# Patient Record
Sex: FEMALE | Race: WHITE | Employment: FULL TIME | URBAN - METROPOLITAN AREA
[De-identification: names, ages, dates, MRNs, and addresses within clinical notes are randomized per-mention and may not be internally consistent; named-entity substitution may affect disease eponyms.]

---

## 2021-05-13 ENCOUNTER — OFFICE VISIT (OUTPATIENT)
Dept: FAMILY MEDICINE CLINIC | Age: 51
End: 2021-05-13
Payer: COMMERCIAL

## 2021-05-13 VITALS
WEIGHT: 148.2 LBS | DIASTOLIC BLOOD PRESSURE: 82 MMHG | HEIGHT: 62 IN | BODY MASS INDEX: 27.27 KG/M2 | RESPIRATION RATE: 14 BRPM | SYSTOLIC BLOOD PRESSURE: 115 MMHG | TEMPERATURE: 98 F | HEART RATE: 79 BPM

## 2021-05-13 DIAGNOSIS — Z13.220 SCREENING CHOLESTEROL LEVEL: ICD-10-CM

## 2021-05-13 DIAGNOSIS — Z82.49 FH: CARDIOVASCULAR DISEASE: ICD-10-CM

## 2021-05-13 DIAGNOSIS — G47.9 SLEEP DISTURBANCE: ICD-10-CM

## 2021-05-13 DIAGNOSIS — R07.9 CHEST PAIN, UNSPECIFIED TYPE: Primary | ICD-10-CM

## 2021-05-13 DIAGNOSIS — H02.63 XANTHELASMA OF EYELID, BILATERAL: ICD-10-CM

## 2021-05-13 DIAGNOSIS — J34.89 LESION OF NOSE: ICD-10-CM

## 2021-05-13 DIAGNOSIS — Z11.4 SCREENING FOR HUMAN IMMUNODEFICIENCY VIRUS WITHOUT PRESENCE OF RISK FACTORS: ICD-10-CM

## 2021-05-13 DIAGNOSIS — Z11.59 ENCOUNTER FOR HEPATITIS C SCREENING TEST FOR LOW RISK PATIENT: ICD-10-CM

## 2021-05-13 DIAGNOSIS — H02.66 XANTHELASMA OF EYELID, BILATERAL: ICD-10-CM

## 2021-05-13 DIAGNOSIS — Z12.11 COLON CANCER SCREENING: ICD-10-CM

## 2021-05-13 DIAGNOSIS — R09.89 BILATERAL CAROTID BRUITS: ICD-10-CM

## 2021-05-13 DIAGNOSIS — Z12.39 ENCOUNTER FOR OTHER SCREENING FOR MALIGNANT NEOPLASM OF BREAST: ICD-10-CM

## 2021-05-13 PROCEDURE — 99204 OFFICE O/P NEW MOD 45 MIN: CPT | Performed by: NURSE PRACTITIONER

## 2021-05-13 RX ORDER — HYDROXYZINE HYDROCHLORIDE 25 MG/1
25 TABLET, FILM COATED ORAL NIGHTLY PRN
Qty: 30 TABLET | Refills: 1 | Status: SHIPPED | OUTPATIENT
Start: 2021-05-13 | End: 2021-07-12

## 2021-05-13 SDOH — HEALTH STABILITY: MENTAL HEALTH: HOW OFTEN DO YOU HAVE A DRINK CONTAINING ALCOHOL?: NEVER

## 2021-05-13 ASSESSMENT — PATIENT HEALTH QUESTIONNAIRE - PHQ9
SUM OF ALL RESPONSES TO PHQ QUESTIONS 1-9: 0
SUM OF ALL RESPONSES TO PHQ9 QUESTIONS 1 & 2: 0
1. LITTLE INTEREST OR PLEASURE IN DOING THINGS: 0

## 2021-05-13 NOTE — PROGRESS NOTES
present for a long time. Significant Past Medical History:  History reviewed. No pertinent past medical history. Allergies:  has No Known Allergies. Medications:   Current Outpatient Medications   Medication Sig Dispense Refill    hydrOXYzine (ATARAX) 25 MG tablet Take 1 tablet by mouth nightly as needed (insomnia) 30 tablet 1     No current facility-administered medications for this visit. Review of systems:  Review of Systems - History obtained from the patient  General ROS: negative for - chills, fatigue or fever  Psychological ROS: sleep disturbances  ENT ROS: negative for - headaches, nasal congestion or nasal discharge  Allergy and Immunology ROS: negative for - seasonal allergies  Hematological and Lymphatic ROS: negative for - bruising  Endocrine ROS: negative for - malaise/lethargy, polydipsia/polyuria or temperature intolerance  Respiratory ROS: negative for - cough,  shortness of breath or wheezing  Cardiovascular ROS: occasional chest pain  Gastrointestinal ROS: negative for - abdominal pain, constipation, diarrhea or nausea/vomiting  Musculoskeletal ROS: negative for - joint pain or muscle pain  Neurological ROS: negative for - dizziness  Dermatological ROS: white bumps on eyelids, raised lesion on tip of nose    Physical Examination:  /82 (Site: Left Upper Arm, Position: Sitting, Cuff Size: Large Adult)   Pulse 79   Temp 98 °F (36.7 °C) (Temporal)   Resp 14   Ht 5' 2\" (1.575 m)   Wt 148 lb 3.2 oz (67.2 kg)   BMI 27.11 kg/m²     General-  Alert and oriented x 3, NAD  HEENT: NC,  anicteric sclerae, xanthelasma bilateral eyes, left more than right  Ears: Normal tympanic membranes bilaterally  Nose: patent, . Non-firm, fixed, Flesh colored papule tip of nose.    Mouth: no lesions, moist mucosas  Neck - supple, no significant adenopathy, carotid bruit noted bilaterally  Chest - clear to auscultation, no wheezes, rales or rhonchi, symmetric air entry  Heart - normal rate, Specific Question:   Reason for exam:     Answer:   Screening    CBC Auto Differential     Standing Status:   Future     Standing Expiration Date:   5/13/2022    Comprehensive Metabolic Panel     Standing Status:   Future     Standing Expiration Date:   5/13/2022    Lipid Panel     Standing Status:   Future     Standing Expiration Date:   5/13/2022     Order Specific Question:   Is Patient Fasting?/# of Hours     Answer:   12 hour    Vitamin D 25 Hydroxy     Standing Status:   Future     Standing Expiration Date:   5/13/2022    Urinalysis with Microscopic     Standing Status:   Future     Standing Expiration Date:   5/14/2022     Order Specific Question:   SPECIFY(EX-CATH,MIDSTREAM,CYSTO,ETC)?      Answer:   midstream    TSH without Reflex     Standing Status:   Future     Standing Expiration Date:   5/13/2022    HIV-1 and HIV-2 Antibodies     Standing Status:   Future     Standing Expiration Date:   5/13/2022    Hepatitis C Antibody     Standing Status:   Future     Standing Expiration Date:   5/13/2022   David Shelton MD, Gastroenterology, [facility] [employer]     Referral Priority:   Routine     Referral Type:   Eval and Treat     Referral Reason:   Specialty Services Required     Referred to Provider:   Ivon Brownlee MD     Requested Specialty:   Gastroenterology     Number of Visits Requested:   Ann Salinas MD, Opthalmology, [facility] [employer]     Referral Priority:   Routine     Referral Type:   Eval and Treat     Referral Reason:   Specialty Services Required     Referred to Provider:   Rylan Salcido MD     Requested Specialty:   Ophthalmology     Number of Visits Requested:   1   Honey Goodwin MD, Plastic Surgery, [facility] [employer]     Referral Priority:   Routine     Referral Type:   Eval and Treat     Referral Reason:   Specialty Services Required     Referred to Provider:   Elisha Denney MD     Requested Specialty:   Plastic Surgery     Number of Visits Requested:   1    CARDIAC STRESS TEST EXERCISE ONLY     Standing Status:   Future     Standing Expiration Date:   7/12/2021     Order Specific Question:   Reason for Exam?     Answer:   Chest pain    EKG 12 lead     Standing Status:   Future     Standing Expiration Date:   7/12/2021     Order Specific Question:   Reason for Exam?     Answer:   Chest pain     Orders Placed This Encounter   Medications    hydrOXYzine (ATARAX) 25 MG tablet     Sig: Take 1 tablet by mouth nightly as needed (insomnia)     Dispense:  30 tablet     Refill:  1     Fasting lab work  Ultrasound of carotid arteries  EKG for baseline  GI referral for colonoscopy  Ophthalmology referral for [address] Surgery referral for spot on nose  Hydroxyzine 1/2- 1 whole tab nightly as needed for sleep  Order for mammogram  Discussed healthy lifestyle modifications  Discussed removal of the Nevi on the posterior neck, as well as doing her Pap here in the office at a later visit. Declines Shingles vaccine today. Does not plan to get COVID vaccine. States her Tetanus is UTD within last 10 years. Follow Up:  Return in about 4 weeks (around 6/10/2021) for for sleep, and to follow up on referrals.  Taco Damon, APRN - CNP

## 2021-05-17 ENCOUNTER — NURSE ONLY (OUTPATIENT)
Dept: LAB | Age: 51
End: 2021-05-17

## 2021-05-17 DIAGNOSIS — H02.66 XANTHELASMA OF EYELID, BILATERAL: ICD-10-CM

## 2021-05-17 DIAGNOSIS — H02.63 XANTHELASMA OF EYELID, BILATERAL: ICD-10-CM

## 2021-05-17 DIAGNOSIS — Z11.4 SCREENING FOR HUMAN IMMUNODEFICIENCY VIRUS WITHOUT PRESENCE OF RISK FACTORS: ICD-10-CM

## 2021-05-17 DIAGNOSIS — R07.9 CHEST PAIN, UNSPECIFIED TYPE: ICD-10-CM

## 2021-05-17 DIAGNOSIS — Z82.49 FH: CARDIOVASCULAR DISEASE: ICD-10-CM

## 2021-05-17 DIAGNOSIS — R09.89 BILATERAL CAROTID BRUITS: ICD-10-CM

## 2021-05-17 DIAGNOSIS — Z11.59 ENCOUNTER FOR HEPATITIS C SCREENING TEST FOR LOW RISK PATIENT: ICD-10-CM

## 2021-05-17 LAB
ALBUMIN SERPL-MCNC: 4.1 G/DL (ref 3.5–5.1)
ALP BLD-CCNC: 58 U/L (ref 38–126)
ALT SERPL-CCNC: 11 U/L (ref 11–66)
ANION GAP SERPL CALCULATED.3IONS-SCNC: 9 MEQ/L (ref 8–16)
AST SERPL-CCNC: 16 U/L (ref 5–40)
BACTERIA: ABNORMAL
BASOPHILS # BLD: 0.6 %
BASOPHILS ABSOLUTE: 0 THOU/MM3 (ref 0–0.1)
BILIRUB SERPL-MCNC: 0.3 MG/DL (ref 0.3–1.2)
BILIRUBIN URINE: NEGATIVE
BLOOD, URINE: NEGATIVE
BUN BLDV-MCNC: 17 MG/DL (ref 7–22)
CALCIUM SERPL-MCNC: 9.1 MG/DL (ref 8.5–10.5)
CASTS: ABNORMAL /LPF
CASTS: ABNORMAL /LPF
CHARACTER, URINE: CLEAR
CHLORIDE BLD-SCNC: 103 MEQ/L (ref 98–111)
CHOLESTEROL, TOTAL: 247 MG/DL (ref 100–199)
CO2: 27 MEQ/L (ref 23–33)
COLOR: YELLOW
CREAT SERPL-MCNC: 0.9 MG/DL (ref 0.4–1.2)
CRYSTALS: ABNORMAL
EOSINOPHIL # BLD: 2.2 %
EOSINOPHILS ABSOLUTE: 0.1 THOU/MM3 (ref 0–0.4)
EPITHELIAL CELLS, UA: ABNORMAL /HPF
ERYTHROCYTE [DISTWIDTH] IN BLOOD BY AUTOMATED COUNT: 12.9 % (ref 11.5–14.5)
ERYTHROCYTE [DISTWIDTH] IN BLOOD BY AUTOMATED COUNT: 42.5 FL (ref 35–45)
GFR SERPL CREATININE-BSD FRML MDRD: 66 ML/MIN/1.73M2
GLUCOSE BLD-MCNC: 86 MG/DL (ref 70–108)
GLUCOSE, URINE: NEGATIVE MG/DL
HCT VFR BLD CALC: 49.4 % (ref 37–47)
HDLC SERPL-MCNC: 52 MG/DL
HEMOGLOBIN: 15.3 GM/DL (ref 12–16)
HEPATITIS C ANTIBODY: NEGATIVE
HIV AG/AB: NONREACTIVE
IMMATURE GRANS (ABS): 0.01 THOU/MM3 (ref 0–0.07)
IMMATURE GRANULOCYTES: 0.2 %
KETONES, URINE: ABNORMAL
LDL CHOLESTEROL CALCULATED: 184 MG/DL
LEUKOCYTE ESTERASE, URINE: NEGATIVE
LYMPHOCYTES # BLD: 35.1 %
LYMPHOCYTES ABSOLUTE: 1.8 THOU/MM3 (ref 1–4.8)
MCH RBC QN AUTO: 28 PG (ref 26–33)
MCHC RBC AUTO-ENTMCNC: 31 GM/DL (ref 32.2–35.5)
MCV RBC AUTO: 90.3 FL (ref 81–99)
MISCELLANEOUS LAB TEST RESULT: ABNORMAL
MONOCYTES # BLD: 12.9 %
MONOCYTES ABSOLUTE: 0.6 THOU/MM3 (ref 0.4–1.3)
NITRITE, URINE: POSITIVE
NUCLEATED RED BLOOD CELLS: 0 /100 WBC
PH UA: 5.5 (ref 5–9)
PLATELET # BLD: 250 THOU/MM3 (ref 130–400)
PMV BLD AUTO: 11.6 FL (ref 9.4–12.4)
POTASSIUM SERPL-SCNC: 4.1 MEQ/L (ref 3.5–5.2)
PROTEIN UA: NEGATIVE MG/DL
RBC # BLD: 5.47 MILL/MM3 (ref 4.2–5.4)
RBC URINE: ABNORMAL /HPF
RENAL EPITHELIAL, UA: ABNORMAL
SEG NEUTROPHILS: 49 %
SEGMENTED NEUTROPHILS ABSOLUTE COUNT: 2.5 THOU/MM3 (ref 1.8–7.7)
SODIUM BLD-SCNC: 139 MEQ/L (ref 135–145)
SPECIFIC GRAVITY UA: 1.02 (ref 1–1.03)
TOTAL PROTEIN: 7.9 G/DL (ref 6.1–8)
TRIGL SERPL-MCNC: 56 MG/DL (ref 0–199)
TSH SERPL DL<=0.05 MIU/L-ACNC: 3.28 UIU/ML (ref 0.4–4.2)
UROBILINOGEN, URINE: 0.2 EU/DL (ref 0–1)
VITAMIN D 25-HYDROXY: 16 NG/ML (ref 30–100)
WBC # BLD: 5 THOU/MM3 (ref 4.8–10.8)
WBC UA: ABNORMAL /HPF
YEAST: ABNORMAL

## 2021-05-19 ENCOUNTER — TELEPHONE (OUTPATIENT)
Dept: FAMILY MEDICINE CLINIC | Age: 51
End: 2021-05-19

## 2021-05-19 DIAGNOSIS — E55.9 VITAMIN D DEFICIENCY: Primary | ICD-10-CM

## 2021-05-19 DIAGNOSIS — N30.00 ACUTE CYSTITIS WITHOUT HEMATURIA: ICD-10-CM

## 2021-05-19 DIAGNOSIS — E78.00 MILD HYPERCHOLESTEROLEMIA: ICD-10-CM

## 2021-05-19 RX ORDER — CIPROFLOXACIN 250 MG/1
250 TABLET, FILM COATED ORAL 2 TIMES DAILY
Qty: 6 TABLET | Refills: 0 | Status: SHIPPED | OUTPATIENT
Start: 2021-05-19 | End: 2021-05-22

## 2021-05-19 NOTE — TELEPHONE ENCOUNTER
Spoke with patient regarding results. Patient verbalized understanding with no questions at this time. Mailing lab orders to repeat in 3 months. Patient will  medication and otc vitamin.

## 2021-05-19 NOTE — TELEPHONE ENCOUNTER
----- Message from QUAN Johnson CNP sent at 5/19/2021 10:44 AM EDT -----  Urine showed UTI, will treat with Cipro x3 days. Vitamin D is low at 16 (should be >30). Can get OTC Vit D3, 1000 IU, take 2 caps daily and recheck in 3 months. Total cholesterol and LDL are elevated, but doesn't need medication yet. Recommend diet low in saturated fats and processed carbohydrates, and aim for 30-45 minutes of physical activity daily. Recheck in 3 months.

## 2021-05-24 ENCOUNTER — OFFICE VISIT (OUTPATIENT)
Dept: FAMILY MEDICINE CLINIC | Age: 51
End: 2021-05-24
Payer: COMMERCIAL

## 2021-05-24 VITALS
HEIGHT: 62 IN | WEIGHT: 146 LBS | SYSTOLIC BLOOD PRESSURE: 131 MMHG | RESPIRATION RATE: 12 BRPM | DIASTOLIC BLOOD PRESSURE: 89 MMHG | BODY MASS INDEX: 26.87 KG/M2 | TEMPERATURE: 97.6 F | HEART RATE: 79 BPM

## 2021-05-24 DIAGNOSIS — N64.4 PAIN OF BOTH BREASTS: Primary | ICD-10-CM

## 2021-05-24 PROCEDURE — 99213 OFFICE O/P EST LOW 20 MIN: CPT | Performed by: NURSE PRACTITIONER

## 2021-05-24 SDOH — ECONOMIC STABILITY: FOOD INSECURITY: WITHIN THE PAST 12 MONTHS, YOU WORRIED THAT YOUR FOOD WOULD RUN OUT BEFORE YOU GOT MONEY TO BUY MORE.: PATIENT DECLINED

## 2021-05-24 ASSESSMENT — SOCIAL DETERMINANTS OF HEALTH (SDOH): HOW HARD IS IT FOR YOU TO PAY FOR THE VERY BASICS LIKE FOOD, HOUSING, MEDICAL CARE, AND HEATING?: PATIENT DECLINED

## 2021-05-24 NOTE — PROGRESS NOTES
[address] [facility] [address] [facility], [address]  Ph:   [phone]  Fax: [phone]    Provider:  QUAN Johnson - CNP    Patient:  Keyonna Singh  YOB: 1970      Visit Date:  5/24/2021     Reason For Visit:   Chief Complaint   Patient presents with    Breast Pain     Bilateral breast pain -- dull ache         Jaspal Mckee is a 48 y.o. female who comes today to the office for breast pain. She states she has been doing well, taking her medications as prescribed. She denies any side effects from her medications. Bilateral breast pain, dull, ache. Heaviness. Present for over a year. No nipple discharge. No dimpling of the skin. Never felt a lump or mass. Occasional self breast exams. FH of breast cancer, on her mother's side, but not mom specifically. At new patient visit recently, she was prescribed Hydroxyzine 25 mg 1 tab nightly as needed for sleep. She took it and it worked well, but she was groggy next day, so she is going to try half a tab next time. Significant Past Medical History:  History reviewed. No pertinent past medical history. Allergies:  has No Known Allergies. Medications:   Current Outpatient Medications   Medication Sig Dispense Refill    hydrOXYzine (ATARAX) 25 MG tablet Take 1 tablet by mouth nightly as needed (insomnia) 30 tablet 1     No current facility-administered medications for this visit.        Review of systems:  Review of Systems - History obtained from the patient  General ROS: negative for - chills, fatigue or fever  : Bilateral breast pain    Physical Examination:  /89 (Site: Left Upper Arm, Position: Sitting, Cuff Size: Medium Adult)   Pulse 79   Temp 97.6 °F (36.4 °C) (Temporal)   Resp 12   Ht 5' 2\" (1.575 m)   Wt 146 lb (66.2 kg)   BMI 26.70 kg/m²     General-  Alert and oriented x 3, NAD  Breasts: breasts appear normal, no suspicious masses, no skin or nipple changes or axillary nodes. Impression:   Diagnosis Orders   1. Pain of both breasts  MEENA DIGITAL DIAGNOSTIC W OR WO CAD BILATERAL       Plan:  Orders Placed This Encounter   Procedures    MEENA DIGITAL DIAGNOSTIC W OR WO CAD BILATERAL     Standing Status:   Future     Standing Expiration Date:   7/24/2022     Order Specific Question:   Reason for exam:     Answer:   bilateral breast pain     No orders of the defined types were placed in this encounter. Follow Up:  Return in about 18 days (around 6/11/2021).     Merlinda Bence, APRN - CNP

## 2021-06-03 ENCOUNTER — HOSPITAL ENCOUNTER (OUTPATIENT)
Dept: NON INVASIVE DIAGNOSTICS | Age: 51
Discharge: HOME | End: 2021-06-03
Payer: COMMERCIAL

## 2021-06-03 ENCOUNTER — HOSPITAL ENCOUNTER (OUTPATIENT)
Dept: INTERVENTIONAL RADIOLOGY/VASCULAR | Age: 51
Discharge: HOME | End: 2021-06-03
Payer: COMMERCIAL

## 2021-06-03 DIAGNOSIS — Z82.49 FH: CARDIOVASCULAR DISEASE: ICD-10-CM

## 2021-06-03 DIAGNOSIS — R09.89 BILATERAL CAROTID BRUITS: ICD-10-CM

## 2021-06-03 DIAGNOSIS — R07.9 CHEST PAIN, UNSPECIFIED TYPE: ICD-10-CM

## 2021-06-03 PROCEDURE — 93880 EXTRACRANIAL BILAT STUDY: CPT

## 2021-06-03 PROCEDURE — 93017 CV STRESS TEST TRACING ONLY: CPT | Performed by: NUCLEAR MEDICINE

## 2021-06-04 ENCOUNTER — HOSPITAL ENCOUNTER (OUTPATIENT)
Age: 51
Discharge: HOME | End: 2021-06-04
Payer: COMMERCIAL

## 2021-06-04 DIAGNOSIS — R07.9 CHEST PAIN, UNSPECIFIED TYPE: ICD-10-CM

## 2021-06-04 DIAGNOSIS — Z82.49 FH: CARDIOVASCULAR DISEASE: ICD-10-CM

## 2021-06-04 LAB
EKG ATRIAL RATE: 70 BPM
EKG P AXIS: 48 DEGREES
EKG P-R INTERVAL: 120 MS
EKG Q-T INTERVAL: 394 MS
EKG QRS DURATION: 84 MS
EKG QTC CALCULATION (BAZETT): 425 MS
EKG R AXIS: 60 DEGREES
EKG T AXIS: 62 DEGREES
EKG VENTRICULAR RATE: 70 BPM

## 2021-06-04 PROCEDURE — 93005 ELECTROCARDIOGRAM TRACING: CPT

## 2021-06-07 ENCOUNTER — TELEPHONE (OUTPATIENT)
Dept: FAMILY MEDICINE CLINIC | Age: 51
End: 2021-06-07

## 2021-06-07 NOTE — TELEPHONE ENCOUNTER
----- Message from QUAN Rivera CNP sent at 6/7/2021  8:48 AM EDT -----  EKG normal. Stress test  not suggestive of ischemia. Medical management recommended. Will discuss more at follow up this week.

## 2021-06-11 ENCOUNTER — OFFICE VISIT (OUTPATIENT)
Dept: FAMILY MEDICINE CLINIC | Age: 51
End: 2021-06-11
Payer: COMMERCIAL

## 2021-06-11 ENCOUNTER — PATIENT MESSAGE (OUTPATIENT)
Dept: FAMILY MEDICINE CLINIC | Age: 51
End: 2021-06-11

## 2021-06-11 VITALS
TEMPERATURE: 98 F | RESPIRATION RATE: 16 BRPM | HEART RATE: 74 BPM | WEIGHT: 148 LBS | BODY MASS INDEX: 27.07 KG/M2 | DIASTOLIC BLOOD PRESSURE: 69 MMHG | SYSTOLIC BLOOD PRESSURE: 114 MMHG

## 2021-06-11 DIAGNOSIS — J34.89 LESION OF NOSE: ICD-10-CM

## 2021-06-11 DIAGNOSIS — E78.00 HYPERCHOLESTEROLEMIA: ICD-10-CM

## 2021-06-11 DIAGNOSIS — G47.9 SLEEP DISTURBANCE: ICD-10-CM

## 2021-06-11 DIAGNOSIS — Z80.6 FAMILY HISTORY OF AML (ACUTE MYELOID LEUKEMIA): ICD-10-CM

## 2021-06-11 DIAGNOSIS — N64.4 PAIN OF BOTH BREASTS: ICD-10-CM

## 2021-06-11 DIAGNOSIS — I77.9 LEFT-SIDED CAROTID ARTERY DISEASE, UNSPECIFIED TYPE (HCC): Primary | ICD-10-CM

## 2021-06-11 PROCEDURE — 99214 OFFICE O/P EST MOD 30 MIN: CPT | Performed by: NURSE PRACTITIONER

## 2021-06-11 RX ORDER — ATORVASTATIN CALCIUM 40 MG/1
40 TABLET, FILM COATED ORAL DAILY
Qty: 90 TABLET | Refills: 1 | Status: SHIPPED | OUTPATIENT
Start: 2021-06-11 | End: 2022-01-20 | Stop reason: SDUPTHER

## 2021-06-11 RX ORDER — POLYETHYLENE GLYCOL 3350, SODIUM SULFATE, SODIUM CHLORIDE, POTASSIUM CHLORIDE, ASCORBIC ACID, SODIUM ASCORBATE 140-9-5.2G
KIT ORAL
COMMUNITY
Start: 2021-06-07

## 2021-06-11 NOTE — TELEPHONE ENCOUNTER
From: Brook Charles  To: Eliezer Boggs APRN - CNP  Sent: 6/11/2021 1:27 PM EDT  Subject: Non-Urgent Medical Question    Hello called to schedule with Dr. Dieudonne Holguin they will need you to call and make the appointment so the insurance to cover visit thank you

## 2021-06-11 NOTE — TELEPHONE ENCOUNTER
I already placed the referral on 5/13. Please confirm it was sent over along with my note from that day.

## 2021-06-11 NOTE — PROGRESS NOTES
[facility] [facility] [address] [facility], [address]  Ph:   [phone]  Fax: [phone]    Provider:  Mirta Runner, APRN - CNP    Patient:  Max Baker  YOB: 1970      Visit Date:  6/11/2021     Reason For Visit:   Chief Complaint   Patient presents with    1 Month Follow-Up     still has pain in breast, mammo not scheduled yet(gave pt scheduling phone #)        Natasha Trujillo is a 48 y.o. female who comes today to the office for follow up. She states she has been doing well, taking her medications as prescribed. She denies any side effects from her medications. As a new patient she presented to me with small white bumps on her eyelids, presumed to be xanthelasma. She also had mild bilateral carotid bruit on exam.  Lab work 5/17 showed total cholesterol 247, triglycerides 56, HDL 52, and . Doppler of carotid arteries were unremarkable on the right, left CCA showed moderate intimal thickening no appreciable stenosis. She has a strong FH of CAD, father had MI at 22, again in his 35s, stent a few years ago. Also CAD in multiple uncles, 1 had sudden cardiac death at 29. She was referred to ophthalmology for the xanthelasma, but they have not reached out to her yet. Was having difficulty falling asleep. Started on hydroxyzine 25 mg 1 tab at night. She is taking that and states she is sleeping well. I saw her on 5/24 complaining of a dull aching, heaviness in both breasts, present for over a year. No discharge from the nipple or dimpling of the skin. She had never felt a mass. Diagnostic mammogram was ordered but she has not had it done yet. She has of family history of breast cancer on her mother side, but not her mother specifically. She was referred to plastic surgery for a bump on the end of her nose, but they have not reached out to her yet. Dad has Myelodysplastic leukemia,  diagnosed last year at age 68.  Doctor told her it was hereditary and family should be checked. She is requesting the genetic testing. Significant Past Medical History:  History reviewed. No pertinent past medical history. Allergies:  has No Known Allergies. Medications:   Current Outpatient Medications   Medication Sig Dispense Refill    atorvastatin (LIPITOR) 40 MG tablet Take 1 tablet by mouth daily 90 tablet 1    PLENVU 140 g SOLR  (Patient not taking: Reported on 6/11/2021)      hydrOXYzine (ATARAX) 25 MG tablet Take 1 tablet by mouth nightly as needed (insomnia) 30 tablet 1     No current facility-administered medications for this visit.        Review of systems:  Review of Systems - History obtained from the patient  General ROS: negative for - chills, fatigue or fever  Psychological ROS: negative for - anxiety, depression or sleep disturbances  ENT ROS: negative for - headaches, nasal congestion or nasal discharge  Allergy and Immunology ROS: negative for - seasonal allergies  Hematological and Lymphatic ROS: negative for - bruising  Endocrine ROS: negative for - malaise/lethargy, polydipsia/polyuria or temperature intolerance  Respiratory ROS: negative for - cough,  shortness of breath or wheezing  Cardiovascular ROS: negative for - chest pain or edema  Gastrointestinal ROS: negative for - abdominal pain, constipation, diarrhea or nausea/vomiting  Musculoskeletal ROS: negative for - joint pain or muscle pain  Neurological ROS: negative for - dizziness  Dermatological ROS: negative for - rash    Physical Examination:  /69   Pulse 74   Temp 98 °F (36.7 °C) (Temporal)   Resp 16   Wt 148 lb (67.1 kg)   BMI 27.07 kg/m²     General-  Alert and oriented x 3, NAD  HEENT: NC, AT, PERRLA, EOMI, anicteric sclerae  Ears: Normal tympanic membranes bilaterally  Nose: patent, no lesions  Mouth: no lesions, moist mucosas  Neck - supple, no significant adenopathy  Chest - clear to auscultation, no wheezes, rales or rhonchi, symmetric air entry  Heart - normal rate, regular rhythm, normal S1, S2, no murmurs, rubs, clicks or gallops  Abdomen - soft, nontender, nondistended, no masses or organomegaly  Extremities - peripheral pulses normal, no pedal edema, no clubbing or cyanosis    Impression:   Diagnosis Orders   1. Left-sided carotid artery disease, unspecified type ([address]  atorvastatin (LIPITOR) 40 MG tablet   2. Hypercholesterolemia  atorvastatin (LIPITOR) 40 MG tablet   3. Sleep disturbance     4. Pain of both breasts     5. Lesion of nose     6. Family history of AML (acute myeloid leukemia)         Plan:  No orders of the defined types were placed in this encounter. Orders Placed This Encounter   Medications    atorvastatin (LIPITOR) 40 MG tablet     Sig: Take 1 tablet by mouth daily     Dispense:  90 tablet     Refill:  1     Start Lipitor 40 mg 1 tab daily  Work on a low fat diet  I will look into the proper genetic testing for Myelodysplastic disorders. Schedule your mammogram to evaluate breast pain  Plastic surgery for bump on nose: Dr Indio Forbes, [address] [address] [facility] for Xanthelasma of eyelids, [phone]    Follow Up:  Return in about 4 months (around 10/11/2021).     Jimbo Alcala, APRN - CNP

## 2021-06-15 ENCOUNTER — NURSE ONLY (OUTPATIENT)
Dept: LAB | Age: 51
End: 2021-06-15

## 2021-06-16 ENCOUNTER — TELEPHONE (OUTPATIENT)
Dept: FAMILY MEDICINE CLINIC | Age: 51
End: 2021-06-16

## 2021-06-16 DIAGNOSIS — N64.4 PAIN OF BOTH BREASTS: Primary | ICD-10-CM

## 2021-06-16 DIAGNOSIS — N64.4 BREAST PAIN: Primary | ICD-10-CM

## 2021-06-16 NOTE — TELEPHONE ENCOUNTER
Patient is also complaining of pain in the bilateral breast. Radiologist at [facility] asking for a bilateral breast ultrasound.

## 2021-06-17 ENCOUNTER — HOSPITAL ENCOUNTER (OUTPATIENT)
Dept: WOMENS IMAGING | Age: 51
End: 2021-06-17
Payer: COMMERCIAL

## 2021-06-17 ENCOUNTER — HOSPITAL ENCOUNTER (OUTPATIENT)
Dept: WOMENS IMAGING | Age: 51
Discharge: HOME | End: 2021-06-17
Payer: COMMERCIAL

## 2021-06-17 DIAGNOSIS — N64.4 PAIN OF BOTH BREASTS: ICD-10-CM

## 2021-06-17 PROCEDURE — G0279 TOMOSYNTHESIS, MAMMO: HCPCS

## 2021-06-21 ENCOUNTER — TELEPHONE (OUTPATIENT)
Dept: FAMILY MEDICINE CLINIC | Age: 51
End: 2021-06-21

## 2021-06-21 NOTE — TELEPHONE ENCOUNTER
----- Message from QUAN Dorman CNP sent at 6/18/2021  3:20 PM EDT -----  No mammographic evidence of malignancy. A one year screening mammogram is recommended.   Some recommendations for pain include a more supportive bra, hot or cold compresses for comfort, Tylenol or ibuprofen, and reduce caffeine intake.

## 2022-01-14 ENCOUNTER — OFFICE VISIT (OUTPATIENT)
Dept: FAMILY MEDICINE CLINIC | Age: 52
End: 2022-01-14
Payer: COMMERCIAL

## 2022-01-14 VITALS
BODY MASS INDEX: 27.75 KG/M2 | HEIGHT: 62 IN | DIASTOLIC BLOOD PRESSURE: 82 MMHG | SYSTOLIC BLOOD PRESSURE: 120 MMHG | HEART RATE: 83 BPM | WEIGHT: 150.8 LBS

## 2022-01-14 DIAGNOSIS — I77.9 LEFT-SIDED CAROTID ARTERY DISEASE, UNSPECIFIED TYPE (HCC): ICD-10-CM

## 2022-01-14 DIAGNOSIS — E78.00 HYPERCHOLESTEROLEMIA: ICD-10-CM

## 2022-01-14 DIAGNOSIS — L81.9 MOTTLED SKIN: Primary | ICD-10-CM

## 2022-01-14 PROCEDURE — 99213 OFFICE O/P EST LOW 20 MIN: CPT | Performed by: NURSE PRACTITIONER

## 2022-01-14 NOTE — PROGRESS NOTES
General: She is not in acute distress. Appearance: She is well-developed. HENT:      Head: Normocephalic. Right Ear: External ear normal.      Left Ear: External ear normal.      Nose: Nose normal.      Mouth/Throat:      Pharynx: No oropharyngeal exudate. Eyes:      Conjunctiva/sclera: Conjunctivae normal.      Pupils: Pupils are equal, round, and reactive to light. Neck:      Thyroid: No thyromegaly. Cardiovascular:      Rate and Rhythm: Normal rate and regular rhythm. Heart sounds: Normal heart sounds. No murmur heard. No friction rub. No gallop. Pulmonary:      Effort: Pulmonary effort is normal. No respiratory distress. Breath sounds: Normal breath sounds. No wheezing or rales. Abdominal:      General: There is no distension. Palpations: Abdomen is soft. There is no mass. Tenderness: There is no abdominal tenderness. There is no guarding. Musculoskeletal:         General: Normal range of motion. Cervical back: Normal range of motion and neck supple. Lymphadenopathy:      Cervical: No cervical adenopathy. Skin:     General: Skin is warm and dry. Capillary Refill: Capillary refill takes less than 2 seconds. Comments: Bilateral lower legs with scattered spots of hypopigmentation. Neurological:      Mental Status: She is alert and oriented to person, place, and time. Psychiatric:         Behavior: Behavior normal.         Thought Content: Thought content normal.         Judgment: Judgment normal.                An electronic signature was used to authenticate this note.     --Deep Everett, QUAN - CNP

## 2022-01-15 ENCOUNTER — NURSE ONLY (OUTPATIENT)
Dept: LAB | Age: 52
End: 2022-01-15

## 2022-01-15 DIAGNOSIS — I77.9 LEFT-SIDED CAROTID ARTERY DISEASE, UNSPECIFIED TYPE (HCC): ICD-10-CM

## 2022-01-15 DIAGNOSIS — L81.9 MOTTLED SKIN: ICD-10-CM

## 2022-01-15 DIAGNOSIS — E78.00 HYPERCHOLESTEROLEMIA: ICD-10-CM

## 2022-01-15 LAB
BASOPHILS # BLD: 0.6 %
BASOPHILS ABSOLUTE: 0 THOU/MM3 (ref 0–0.1)
EOSINOPHIL # BLD: 2.6 %
EOSINOPHILS ABSOLUTE: 0.1 THOU/MM3 (ref 0–0.4)
ERYTHROCYTE [DISTWIDTH] IN BLOOD BY AUTOMATED COUNT: 13.5 % (ref 11.5–14.5)
ERYTHROCYTE [DISTWIDTH] IN BLOOD BY AUTOMATED COUNT: 45.1 FL (ref 35–45)
HCT VFR BLD CALC: 46 % (ref 37–47)
HEMOGLOBIN: 14.7 GM/DL (ref 12–16)
IMMATURE GRANS (ABS): 0.02 THOU/MM3 (ref 0–0.07)
IMMATURE GRANULOCYTES: 0.4 %
LYMPHOCYTES # BLD: 35.1 %
LYMPHOCYTES ABSOLUTE: 1.9 THOU/MM3 (ref 1–4.8)
MCH RBC QN AUTO: 28.9 PG (ref 26–33)
MCHC RBC AUTO-ENTMCNC: 32 GM/DL (ref 32.2–35.5)
MCV RBC AUTO: 90.6 FL (ref 81–99)
MONOCYTES # BLD: 13.7 %
MONOCYTES ABSOLUTE: 0.7 THOU/MM3 (ref 0.4–1.3)
NUCLEATED RED BLOOD CELLS: 0 /100 WBC
PLATELET # BLD: 321 THOU/MM3 (ref 130–400)
PMV BLD AUTO: 10.5 FL (ref 9.4–12.4)
RBC # BLD: 5.08 MILL/MM3 (ref 4.2–5.4)
RHEUMATOID FACTOR: < 10 IU/ML (ref 0–13)
SEDIMENTATION RATE, ERYTHROCYTE: 1 MM/HR (ref 0–20)
SEG NEUTROPHILS: 47.6 %
SEGMENTED NEUTROPHILS ABSOLUTE COUNT: 2.6 THOU/MM3 (ref 1.8–7.7)
WBC # BLD: 5.4 THOU/MM3 (ref 4.8–10.8)

## 2022-01-17 LAB
C-REACTIVE PROTEIN, HIGH SENSITIVITY: 1.2 MG/L
CYCLIC CITRULLINATED PEPTIDE ANTIBODY IGG: 2 U/ML (ref 0–7)

## 2022-01-19 LAB
ANA PATTERN: ABNORMAL
ANA SCREEN: DETECTED
ANA TITER: ABNORMAL
ANTINUCLEAR ANTIBODY, HEP-2, IGG: DETECTED

## 2022-01-20 DIAGNOSIS — R76.8 POSITIVE ANA (ANTINUCLEAR ANTIBODY): Primary | ICD-10-CM

## 2022-01-20 DIAGNOSIS — I77.9 LEFT-SIDED CAROTID ARTERY DISEASE, UNSPECIFIED TYPE (HCC): ICD-10-CM

## 2022-01-20 DIAGNOSIS — E78.00 HYPERCHOLESTEROLEMIA: ICD-10-CM

## 2022-01-20 DIAGNOSIS — L81.9 MOTTLED SKIN: ICD-10-CM

## 2022-01-20 RX ORDER — ATORVASTATIN CALCIUM 40 MG/1
40 TABLET, FILM COATED ORAL DAILY
Qty: 90 TABLET | Refills: 1 | Status: SHIPPED | OUTPATIENT
Start: 2022-01-20

## 2022-01-21 ENCOUNTER — TELEPHONE (OUTPATIENT)
Dept: FAMILY MEDICINE CLINIC | Age: 52
End: 2022-01-21

## 2022-01-21 NOTE — TELEPHONE ENCOUNTER
Spoke with the patient regarding results. Verbalized understanding. Rheumatology office will contact her directly for scheduling. No additional questions from the patient at this time.

## 2022-01-21 NOTE — TELEPHONE ENCOUNTER
----- Message from QUAN Witt CNP sent at 1/20/2022  5:21 PM EST -----  Most of her lab work was within acceptable range. Her ISHMAEL was positive, which is an antinuclear antibody, and it MAY signal an autoimmune disease. We need to refer to rheumatology for further work-up. It may also be related to the blotchy spots on her leg.

## 2022-11-30 NOTE — PROGRESS NOTES
Jitendra   Date Of Service: 12/1/2022  Provider: Brandy Vasquez DO, DO  Name: Nathalie Hargrove   MRN: [id number]    Chief Complaint(s)      Chief Complaint   Patient presents with    New Patient     New patient, referred by Dr. Robbie Hernandez, Positive ISHMAEL, mottled skin    Pt stated unable to rate pain - Bilateral leg pain (mostly R leg, but some L leg as well) Had bruise in R leg that spider webbed. History of Present illness (HPI)    Nathalie Hargrove   is a(n)52 y.o. female with a hx of hypercholesterolemia, left sided carotid artery disease referred by Shalini Espinosa, APR* for evaluation of  + ISHMAEL 1:320 speckled, mottled skin,     Bruise with mottled apperance along the bilat lower legs since 2021 , S/p evaluation by dermatology , pcp performed blood testing with + ISHMAEL , +  prior use of desk heater that was aimed at the legs but stopped using 6 months ago given a friend needed. Denies prior history of DVT, preeclampsia, eclampsia, family history of thromboembolic disease. Positive strong family history of coronary disease. + constant aching pain of he hands, knes, ankles, typically most in the mornings. Pain up to 6/10 over the past wk + gelling . Aggravating factors:cold expsoure . Alleviating factors:tylenol prn. No other tx. . Prior hx of Right ankle fracture. Weakness of legs with prolonged standing x 1 year. Sx's improved with sitting down for 5-10 minutes.        -denies Photosenstivity, Rash, dry mouth/dry eyes, oral/nasal sores, , digital ulcerations, skin tightening, renal disease,foamy urination, hematuria, sz's, blood clots,  pre-term labor loss, AIHA,leukpenia/lymphopenia, thrombocytopenia, hair loss, serositis, enthesitis, dactylitis, nail changes, hx of STD,  personal or family history of Psoriatic arthritis, psoriasis, ank spond,       Cancer screening: last pap - 89, up to date on mammo and c-scope       Review of Systems    Review of Systems Constitutional: Negative. HENT: Negative. Eyes: Negative. Respiratory: Negative. Cardiovascular: Negative. Gastrointestinal: Negative. Endocrine: Negative. Genitourinary: Negative. Skin:  Positive for color change. Neurological: Negative. Negative for dizziness and numbness. Hematological: Negative. PAST MEDICAL HISTORY     has no past medical history on file. PAST SURGICAL HISTORY     has a past surgical history that includes Dental surgery; Mouth Biopsy; Tubal ligation; Tubal ligation (1991); and Leg Surgery (Left). FAMILY HISTORY      Family History   Problem Relation Age of Onset    Heart Disease Father     High Blood Pressure Father     Coronary Art Dis Father         luekemia     Diabetes Paternal Aunt     High Blood Pressure Paternal Aunt     Diabetes Paternal Uncle     High Blood Pressure Paternal Uncle     Diabetes Maternal Grandmother     High Blood Pressure Maternal Grandmother     Heart Disease Maternal Grandmother     Cancer Maternal Grandmother     Diabetes Maternal Grandfather     High Blood Pressure Maternal Grandfather     Heart Disease Maternal Grandfather     Cancer Maternal Grandfather     Heart Disease Paternal Grandmother     Cancer Paternal Grandmother         uterine ca     High Blood Pressure Paternal Grandmother     Diabetes Paternal Grandmother     Heart Disease Paternal Grandfather     High Blood Pressure Paternal Grandfather     High Cholesterol Paternal Grandfather     Breast Cancer Maternal Cousin 47       SOCIAL HISTORY     reports that she has never smoked. She has never used smokeless tobacco. She reports that she does not drink alcohol and does not use drugs.       ALLERGIES   No Known Allergies    CURRENT MEDICATIONS      Current Outpatient Medications:     atorvastatin (LIPITOR) 40 MG tablet, Take 1 tablet by mouth daily, Disp: 90 tablet, Rfl: 1    PHYSICAL EXAMINATION / OBJECTIVE     Objective:  /84 (Site: Left Upper Arm, Position: Sitting, Cuff Size: Large Adult)   Pulse 97   Ht 5' 2.01\" (1.575 m)   Wt 152 lb 3.2 oz (69 kg)   SpO2 96%   BMI 27.83 kg/m²     Physical Exam  Vitals reviewed. Constitutional:       Appearance: Normal appearance. HENT:      Head: Normocephalic. Nose:      Comments: Left nasal septal sore - scabbed     Mouth/Throat:      Mouth: Mucous membranes are moist.      Comments: Geogrphic tongue  Eyes:      Conjunctiva/sclera: Conjunctivae normal.   Cardiovascular:      Rate and Rhythm: Normal rate. Heart sounds: No murmur heard. Pulmonary:      Effort: Pulmonary effort is normal.      Breath sounds: Normal breath sounds. Musculoskeletal:         General: No swelling or tenderness. Normal range of motion. Skin:     General: Skin is warm. Capillary Refill: Capillary refill takes less than 2 seconds. Decreased Right radial pulses. 2/4 dorsal pedis , left radial .      Findings: Lesion (hypopigmented lesions macular lesions) present. No rash. Comments: -- Scarred areas with hypopigmentation bilatera arms  -- Tattoos, arms, back, legs   -- white to blue color changes of the feet     Neurological:      General: No focal deficit present. Mental Status: She is alert and oriented to person, place, and time.                LABS        CBC  Lab Results   Component Value Date/Time    WBC 5.4 01/15/2022 07:22 AM    RBC 5.08 01/15/2022 07:22 AM    HGB 14.7 01/15/2022 07:22 AM    HCT 46.0 01/15/2022 07:22 AM    MCV 90.6 01/15/2022 07:22 AM    MCH 28.9 01/15/2022 07:22 AM    MCHC 32.0 01/15/2022 07:22 AM    RDW 13.4 02/09/2017 08:29 AM     01/15/2022 07:22 AM       CMP  Lab Results   Component Value Date/Time    CALCIUM 9.1 05/17/2021 07:36 AM    LABALBU 4.1 05/17/2021 07:36 AM    PROT 7.9 05/17/2021 07:36 AM     05/17/2021 07:36 AM    K 4.1 05/17/2021 07:36 AM    CO2 27 05/17/2021 07:36 AM     05/17/2021 07:36 AM    BUN 17 05/17/2021 07:36 AM    CREATININE 0.9 05/17/2021 07:36 AM    ALKPHOS 58 05/17/2021 07:36 AM    ALT 11 05/17/2021 07:36 AM    AST 16 05/17/2021 07:36 AM       HgBA1c: No components found for: HGBA1C    Lab Results   Component Value Date    TSH 3.280 05/17/2021     Lab Results   Component Value Date/Time    VITD25 16 05/17/2021 07:36 AM     Lab Results   Component Value Date    ANASCRN Detected (A) 01/15/2022     Lab Results   Component Value Date    RF < 10 01/15/2022       No components found for: CANCASCRN, APANCASCRN  Lab Results   Component Value Date    SEDRATE 1 01/15/2022       RADIOLOGY:       PROCEDURE: VL DUP CAROTID BILATERAL       CLINICAL INFORMATION: Bilateral carotid bruits, FH: cardiovascular disease       COMPARISON: No prior study. TECHNIQUE: Multiple grayscale and color flow images of both carotid systems and both vertebral arteries were obtained. Spectral Doppler waveforms were generated and velocity measurements were obtained. RIGHT    PSV/EDV        DIST CCA-------->64/27cm/s    PROX ICA-------->73/28cm/s    ECA---------------->99/23cm/s    VERT-------------->41/16cm/s        LEFT    PSV/EDV        DIST CCA-------->85/31cm/s    PROX ICA-------->62/30cm/s    ECA---------------->94/24cm/s    VERT-------------->62/22cm/s               Impression       1. RIGHT   ICA . Telma Banquete Telma Banquete Unremarkable . Telma Banquete ECA. Telma Banquete Unremarkable    CCA. Unremarkable . Telma Banquete VERT Antegrade flow. ..       2. LEFT    ICA. .... Unremarkable. .   ECA. .. Unremarkable   CCA. .. Moderate intimal thickening, no appreciable stenosis. Rodriguez Hatchet. Antegrade flow. [facility]          Patient Name: Willie Byrd         Patient Type: Emergency           MRN:  [id number]         Gender:  Female                   Account Number:  [de-identified]         [facility] Phys:  Thea Spillers    YOB: 1970         MILADYS GONSALEZ Pt Loc:  ED                                  R a d i o l o g y   R e p o r t            Accession Number:  Procedure Name: Exam Date/Time:        [id number]      VAS Duplex Extr Low Veins   12/1/2011 8:30:00 AM                           Rt            CPT4 code        12417                Reason For Exam        r/o dvt        ed 19                REPORT        RIGHT LOWER EXTREMITY VENOUS SONOGRAM:            CLINICAL INFORMATION:  Right posterior rib pain. PHYSICIAN INTERPRETATION:  Multiple color flow and spectral doppler        sonographic images were obtained of the right lower extremity. The common        femoral, femoral, greater saphenous and popliteal vein as well as the deep        veins of the calf are compressible and patent with no evidence of        intraluminal echogenic material.  There is no sonographic evidence of deep        venous thrombosis involving the right lower extremity. IMPRESSION:            NO SONOGRAPHIC EVIDENCE OF DEEP VENOUS THROMBOSIS            INVOLVING THE RIGHT LOWER EXTREMITY. ASSESSMENT/PLAN      1. ISHMAEL positive    2. Polyarthralgia    3. Mottled skin    4. Raynaud's disease without gangrene    5. Vitamin D deficiency      1. ISHMAEL positive 1:320 speckled   2. Polyarthralgia  --- arthralgia of the ankles, knees, hands - presnt for years, worse in the morning, and cold ewather. Denies am stiffness, or joint swelling. + Gelling. Exam with Raynaud's type color change of the hands, left nasal septal ulceration. Prior laboratory review revealing leukopenia x1 in 2017  --With the symptoms of arthralgias positive ISHMAEL, nasal septal ulcerations, Raynaud's type color changes and the prior history of a low white blood cell count there is concern for an underlying connective tissue disease such as systemic lupus. - Miscellaneous Sendout; Future - avise CTD panel.   - C3 Complement; Future  - C4 Complement; Future  - Complement, Total; Future  - Lupus Anticoagulant; Future  - Urinalysis; Future  - Protein / creatinine ratio, urine;  Future  - Electrophoresis Protein, Serum with Reflex to Immunofixation; Future  - Protein Electrophoresis, Urine; Future    3. Mottled skin -- ? Livedo vs erythema ab igne  --- eval for CTD, APLS ab's. - intact lower extremity pulses. Decreased Right radial pulses. - lower concern for PAD at this time. Patient also denies any history of prior DVT, preeclampsia, eclampsia, CVA or stroke. 4. Raynaud's disease without gangrene - red to blod color changes of the feet appreciated     - Miscellaneous Sendout; Future  - C3 Complement; Future  - C4 Complement; Future  - Complement, Total; Future  - Lupus Anticoagulant; Future  - Urinalysis; Future  - Protein / creatinine ratio, urine; Future  - Electrophoresis Protein, Serum with Reflex to Immunofixation; Future  - Protein Electrophoresis, Urine; Future    5. Vitamin D deficiency   - Vitamin D 25 Hydroxy; Future             No follow-ups on file. Electronically signed by Anselmo Phan DO on 12/1/2022 at 1:39 PM    New Prescriptions    No medications on file       12/1/2022       The risks and benefits of my recommendations, as well as other treatment options, benefits and side effects were discussed with the patient today. Questions were answered. Thank you for allowing me to participate in the care of this patient. Please call if there are any questions. 12-Oct-2021 13:46

## 2022-12-01 ENCOUNTER — NURSE ONLY (OUTPATIENT)
Dept: LAB | Age: 52
End: 2022-12-01

## 2022-12-01 ENCOUNTER — OFFICE VISIT (OUTPATIENT)
Dept: RHEUMATOLOGY | Age: 52
End: 2022-12-01
Payer: COMMERCIAL

## 2022-12-01 VITALS
SYSTOLIC BLOOD PRESSURE: 116 MMHG | HEIGHT: 62 IN | HEART RATE: 97 BPM | DIASTOLIC BLOOD PRESSURE: 84 MMHG | OXYGEN SATURATION: 96 % | BODY MASS INDEX: 28.01 KG/M2 | WEIGHT: 152.2 LBS

## 2022-12-01 DIAGNOSIS — M25.50 POLYARTHRALGIA: ICD-10-CM

## 2022-12-01 DIAGNOSIS — E55.9 VITAMIN D DEFICIENCY: ICD-10-CM

## 2022-12-01 DIAGNOSIS — R76.8 ANA POSITIVE: Primary | ICD-10-CM

## 2022-12-01 DIAGNOSIS — I73.00 RAYNAUD'S DISEASE WITHOUT GANGRENE: ICD-10-CM

## 2022-12-01 DIAGNOSIS — R76.8 ANA POSITIVE: ICD-10-CM

## 2022-12-01 DIAGNOSIS — L81.9 MOTTLED SKIN: ICD-10-CM

## 2022-12-01 LAB
BACTERIA: ABNORMAL
BILIRUBIN URINE: NEGATIVE
BLOOD, URINE: ABNORMAL
CASTS: ABNORMAL /LPF
CASTS: ABNORMAL /LPF
CHARACTER, URINE: CLEAR
COLOR: YELLOW
CREATININE URINE: 245.4 MG/DL
CRYSTALS: ABNORMAL
EPITHELIAL CELLS, UA: ABNORMAL /HPF
GLUCOSE, URINE: NEGATIVE MG/DL
KETONES, URINE: NEGATIVE
LEUKOCYTE EST, POC: NEGATIVE
MISCELLANEOUS LAB TEST RESULT: ABNORMAL
NITRITE, URINE: NEGATIVE
PH UA: 5 (ref 5–9)
PROT/CREAT RATIO, UR: 0.04
PROTEIN UA: NEGATIVE MG/DL
PROTEIN, URINE: 10.1 MG/DL
RBC URINE: ABNORMAL /HPF
RENAL EPITHELIAL, UA: ABNORMAL
SPECIFIC GRAVITY UA: 1.02 (ref 1–1.03)
UROBILINOGEN, URINE: 0.2 EU/DL (ref 0–1)
VITAMIN D 25-HYDROXY: 13 NG/ML (ref 30–100)
WBC UA: ABNORMAL /HPF
YEAST: ABNORMAL

## 2022-12-01 PROCEDURE — 99214 OFFICE O/P EST MOD 30 MIN: CPT | Performed by: INTERNAL MEDICINE

## 2022-12-01 ASSESSMENT — ENCOUNTER SYMPTOMS
EYES NEGATIVE: 1
COLOR CHANGE: 1
RESPIRATORY NEGATIVE: 1
GASTROINTESTINAL NEGATIVE: 1

## 2022-12-02 LAB
C3 COMPLEMENT: 131 MG/DL (ref 90–180)
COMPLEMENT C4: 25 MG/DL (ref 10–40)

## 2022-12-02 RX ORDER — ERGOCALCIFEROL 1.25 MG/1
50000 CAPSULE ORAL WEEKLY
Qty: 12 CAPSULE | Refills: 0 | Status: SHIPPED | OUTPATIENT
Start: 2022-12-02

## 2022-12-04 LAB — PROTEIN ELECTROPHORESIS, URINE: NORMAL

## 2022-12-05 LAB
COMPLEMENT TOTAL (CH50): 81.6 U/ML (ref 38.7–89.9)
DRVVT 1:1 MIX: ABNORMAL SEC (ref 33–44)
DRVVT CONFIRMATION TEST: ABNORMAL RATIO
DRVVT SCREEN: 27 SEC (ref 33–44)
HEXAGONAL PHOSPHOLIPID NEUTRALIZAT TEST: ABNORMAL
LUPUS ANTICOAG INTERP: ABNORMAL
PLATELET NEUTRALIZATION: ABNORMAL
PROTHROMBIN TIME: 13.4 SEC (ref 12–15.5)
PTT 1:1 MIX: 41 SEC (ref 32–48)
PTT LUPUS ANTICOAGULANT: 50 SEC (ref 32–48)
PTT-HEPARIN NEUTRALIZED: ABNORMAL SEC (ref 32–48)
REPTILASE TIME: ABNORMAL SEC
THROMBIN TIME: 15.8 SEC (ref 14.7–19.5)

## 2022-12-09 ENCOUNTER — TELEPHONE (OUTPATIENT)
Dept: RHEUMATOLOGY | Age: 52
End: 2022-12-09

## 2022-12-09 NOTE — TELEPHONE ENCOUNTER
Patient called with labs results and suspected diagnoses were discussed. Summary as outlined below       Suspected Sjogren vs SLE ---  ISHMAEL positive 1:320 speckled , + SSA, + SSB,  Polyarthralgia, raynaud's, nasal sores. --- arthralgia of the ankles, knees, hands - presnt for years, worse in the morning, and cold ewather. Denies am stiffness, or joint swelling. + Gelling. Exam with Raynaud's type color change of the hands, left nasal septal ulceration. Prior laboratory review revealing leukopenia x1 in 2017  -- patient declined trial of plaquenil      2. Raynaud's disease without gangrene - feet    3. Livedo - bilat lower extremities     - declined TCA or CCB at this time.     - patient to use conservative mesaures

## 2023-02-28 ENCOUNTER — NURSE ONLY (OUTPATIENT)
Dept: LAB | Age: 53
End: 2023-02-28

## 2023-03-13 LAB — CYTOLOGY THIN PREP PAP: NORMAL

## 2023-04-30 ENCOUNTER — PREP FOR PROCEDURE (OUTPATIENT)
Dept: OBGYN | Age: 53
End: 2023-04-30

## 2023-04-30 RX ORDER — SODIUM CHLORIDE 0.9 % (FLUSH) 0.9 %
5-40 SYRINGE (ML) INJECTION PRN
Status: CANCELLED | OUTPATIENT
Start: 2023-04-30

## 2023-04-30 RX ORDER — ONDANSETRON 2 MG/ML
8 INJECTION INTRAMUSCULAR; INTRAVENOUS EVERY 8 HOURS PRN
Status: CANCELLED | OUTPATIENT
Start: 2023-04-30

## 2023-04-30 RX ORDER — SODIUM CHLORIDE 0.9 % (FLUSH) 0.9 %
5-40 SYRINGE (ML) INJECTION EVERY 12 HOURS SCHEDULED
Status: CANCELLED | OUTPATIENT
Start: 2023-04-30

## 2023-04-30 RX ORDER — SODIUM CHLORIDE 9 MG/ML
INJECTION, SOLUTION INTRAVENOUS PRN
Status: CANCELLED | OUTPATIENT
Start: 2023-04-30

## 2023-04-30 RX ORDER — SODIUM CHLORIDE, SODIUM LACTATE, POTASSIUM CHLORIDE, CALCIUM CHLORIDE 600; 310; 30; 20 MG/100ML; MG/100ML; MG/100ML; MG/100ML
INJECTION, SOLUTION INTRAVENOUS CONTINUOUS
Status: CANCELLED | OUTPATIENT
Start: 2023-04-30

## 2023-05-01 ENCOUNTER — ANESTHESIA EVENT (OUTPATIENT)
Dept: OPERATING ROOM | Age: 53
End: 2023-05-01
Payer: COMMERCIAL

## 2023-05-01 ENCOUNTER — HOSPITAL ENCOUNTER (OUTPATIENT)
Age: 53
Setting detail: OUTPATIENT SURGERY
Discharge: HOME | End: 2023-05-01
Attending: OBSTETRICS & GYNECOLOGY | Admitting: OBSTETRICS & GYNECOLOGY
Payer: COMMERCIAL

## 2023-05-01 ENCOUNTER — ANESTHESIA (OUTPATIENT)
Dept: OPERATING ROOM | Age: 53
End: 2023-05-01
Payer: COMMERCIAL

## 2023-05-01 VITALS
WEIGHT: 151.6 LBS | TEMPERATURE: 97.6 F | OXYGEN SATURATION: 94 % | BODY MASS INDEX: 27.9 KG/M2 | RESPIRATION RATE: 12 BRPM | SYSTOLIC BLOOD PRESSURE: 129 MMHG | HEART RATE: 76 BPM | HEIGHT: 62 IN | DIASTOLIC BLOOD PRESSURE: 81 MMHG

## 2023-05-01 DIAGNOSIS — N92.1 METRORRHAGIA: ICD-10-CM

## 2023-05-01 PROCEDURE — 2709999900 HC NON-CHARGEABLE SUPPLY: Performed by: OBSTETRICS & GYNECOLOGY

## 2023-05-01 PROCEDURE — 7100000011 HC PHASE II RECOVERY - ADDTL 15 MIN: Performed by: OBSTETRICS & GYNECOLOGY

## 2023-05-01 PROCEDURE — 3700000001 HC ADD 15 MINUTES (ANESTHESIA): Performed by: OBSTETRICS & GYNECOLOGY

## 2023-05-01 PROCEDURE — 2720000010 HC SURG SUPPLY STERILE: Performed by: OBSTETRICS & GYNECOLOGY

## 2023-05-01 PROCEDURE — 88305 TISSUE EXAM BY PATHOLOGIST: CPT

## 2023-05-01 PROCEDURE — 3600000012 HC SURGERY LEVEL 2 ADDTL 15MIN: Performed by: OBSTETRICS & GYNECOLOGY

## 2023-05-01 PROCEDURE — 2500000003 HC RX 250 WO HCPCS: Performed by: NURSE ANESTHETIST, CERTIFIED REGISTERED

## 2023-05-01 PROCEDURE — 6360000002 HC RX W HCPCS: Performed by: NURSE ANESTHETIST, CERTIFIED REGISTERED

## 2023-05-01 PROCEDURE — C1886 CATHETER, ABLATION: HCPCS | Performed by: OBSTETRICS & GYNECOLOGY

## 2023-05-01 PROCEDURE — 2580000003 HC RX 258: Performed by: OBSTETRICS & GYNECOLOGY

## 2023-05-01 PROCEDURE — 3700000000 HC ANESTHESIA ATTENDED CARE: Performed by: OBSTETRICS & GYNECOLOGY

## 2023-05-01 PROCEDURE — 7100000001 HC PACU RECOVERY - ADDTL 15 MIN: Performed by: OBSTETRICS & GYNECOLOGY

## 2023-05-01 PROCEDURE — 6370000000 HC RX 637 (ALT 250 FOR IP)

## 2023-05-01 PROCEDURE — 7100000000 HC PACU RECOVERY - FIRST 15 MIN: Performed by: OBSTETRICS & GYNECOLOGY

## 2023-05-01 PROCEDURE — 7100000010 HC PHASE II RECOVERY - FIRST 15 MIN: Performed by: OBSTETRICS & GYNECOLOGY

## 2023-05-01 PROCEDURE — 3600000002 HC SURGERY LEVEL 2 BASE: Performed by: OBSTETRICS & GYNECOLOGY

## 2023-05-01 RX ORDER — SODIUM CHLORIDE 9 MG/ML
INJECTION, SOLUTION INTRAVENOUS PRN
Status: DISCONTINUED | OUTPATIENT
Start: 2023-05-01 | End: 2023-05-01 | Stop reason: HOSPADM

## 2023-05-01 RX ORDER — SODIUM CHLORIDE 0.9 % (FLUSH) 0.9 %
5-40 SYRINGE (ML) INJECTION EVERY 12 HOURS SCHEDULED
Status: DISCONTINUED | OUTPATIENT
Start: 2023-05-01 | End: 2023-05-01 | Stop reason: HOSPADM

## 2023-05-01 RX ORDER — PROPOFOL 10 MG/ML
INJECTION, EMULSION INTRAVENOUS PRN
Status: DISCONTINUED | OUTPATIENT
Start: 2023-05-01 | End: 2023-05-01 | Stop reason: SDUPTHER

## 2023-05-01 RX ORDER — FENTANYL CITRATE 50 UG/ML
50 INJECTION, SOLUTION INTRAMUSCULAR; INTRAVENOUS EVERY 5 MIN PRN
Status: DISCONTINUED | OUTPATIENT
Start: 2023-05-01 | End: 2023-05-01 | Stop reason: HOSPADM

## 2023-05-01 RX ORDER — SODIUM CHLORIDE, SODIUM LACTATE, POTASSIUM CHLORIDE, CALCIUM CHLORIDE 600; 310; 30; 20 MG/100ML; MG/100ML; MG/100ML; MG/100ML
INJECTION, SOLUTION INTRAVENOUS CONTINUOUS
Status: DISCONTINUED | OUTPATIENT
Start: 2023-05-01 | End: 2023-05-01 | Stop reason: HOSPADM

## 2023-05-01 RX ORDER — DIPHENHYDRAMINE HYDROCHLORIDE 50 MG/ML
12.5 INJECTION INTRAMUSCULAR; INTRAVENOUS
Status: DISCONTINUED | OUTPATIENT
Start: 2023-05-01 | End: 2023-05-01 | Stop reason: HOSPADM

## 2023-05-01 RX ORDER — ONDANSETRON 2 MG/ML
4 INJECTION INTRAMUSCULAR; INTRAVENOUS EVERY 6 HOURS PRN
Status: DISCONTINUED | OUTPATIENT
Start: 2023-05-01 | End: 2023-05-01 | Stop reason: HOSPADM

## 2023-05-01 RX ORDER — ONDANSETRON 2 MG/ML
4 INJECTION INTRAMUSCULAR; INTRAVENOUS
Status: DISCONTINUED | OUTPATIENT
Start: 2023-05-01 | End: 2023-05-01 | Stop reason: HOSPADM

## 2023-05-01 RX ORDER — OXYCODONE HYDROCHLORIDE 5 MG/1
5 TABLET ORAL
Status: DISCONTINUED | OUTPATIENT
Start: 2023-05-01 | End: 2023-05-01 | Stop reason: HOSPADM

## 2023-05-01 RX ORDER — MORPHINE SULFATE 2 MG/ML
4 INJECTION, SOLUTION INTRAMUSCULAR; INTRAVENOUS
Status: DISCONTINUED | OUTPATIENT
Start: 2023-05-01 | End: 2023-05-01 | Stop reason: HOSPADM

## 2023-05-01 RX ORDER — LIDOCAINE HYDROCHLORIDE 20 MG/ML
INJECTION, SOLUTION EPIDURAL; INFILTRATION; INTRACAUDAL; PERINEURAL PRN
Status: DISCONTINUED | OUTPATIENT
Start: 2023-05-01 | End: 2023-05-01 | Stop reason: SDUPTHER

## 2023-05-01 RX ORDER — SODIUM CHLORIDE 0.9 % (FLUSH) 0.9 %
5-40 SYRINGE (ML) INJECTION PRN
Status: DISCONTINUED | OUTPATIENT
Start: 2023-05-01 | End: 2023-05-01 | Stop reason: HOSPADM

## 2023-05-01 RX ORDER — ACETAMINOPHEN 500 MG
TABLET ORAL
Status: COMPLETED
Start: 2023-05-01 | End: 2023-05-01

## 2023-05-01 RX ORDER — OXYCODONE HYDROCHLORIDE 5 MG/1
5 TABLET ORAL EVERY 4 HOURS PRN
Status: DISCONTINUED | OUTPATIENT
Start: 2023-05-01 | End: 2023-05-01 | Stop reason: HOSPADM

## 2023-05-01 RX ORDER — MEPERIDINE HYDROCHLORIDE 25 MG/ML
12.5 INJECTION INTRAMUSCULAR; INTRAVENOUS; SUBCUTANEOUS ONCE
Status: DISCONTINUED | OUTPATIENT
Start: 2023-05-01 | End: 2023-05-01 | Stop reason: HOSPADM

## 2023-05-01 RX ORDER — ACETAMINOPHEN 500 MG
1000 TABLET ORAL EVERY 8 HOURS PRN
Status: DISCONTINUED | OUTPATIENT
Start: 2023-05-01 | End: 2023-05-01 | Stop reason: HOSPADM

## 2023-05-01 RX ORDER — DROPERIDOL 2.5 MG/ML
0.62 INJECTION, SOLUTION INTRAMUSCULAR; INTRAVENOUS
Status: DISCONTINUED | OUTPATIENT
Start: 2023-05-01 | End: 2023-05-01 | Stop reason: HOSPADM

## 2023-05-01 RX ORDER — LABETALOL HYDROCHLORIDE 5 MG/ML
10 INJECTION, SOLUTION INTRAVENOUS
Status: DISCONTINUED | OUTPATIENT
Start: 2023-05-01 | End: 2023-05-01 | Stop reason: HOSPADM

## 2023-05-01 RX ORDER — HYDRALAZINE HYDROCHLORIDE 20 MG/ML
10 INJECTION INTRAMUSCULAR; INTRAVENOUS
Status: DISCONTINUED | OUTPATIENT
Start: 2023-05-01 | End: 2023-05-01 | Stop reason: HOSPADM

## 2023-05-01 RX ORDER — DEXTROSE MONOHYDRATE 100 MG/ML
INJECTION, SOLUTION INTRAVENOUS CONTINUOUS PRN
Status: DISCONTINUED | OUTPATIENT
Start: 2023-05-01 | End: 2023-05-01 | Stop reason: HOSPADM

## 2023-05-01 RX ORDER — IPRATROPIUM BROMIDE AND ALBUTEROL SULFATE 2.5; .5 MG/3ML; MG/3ML
1 SOLUTION RESPIRATORY (INHALATION)
Status: DISCONTINUED | OUTPATIENT
Start: 2023-05-01 | End: 2023-05-01 | Stop reason: HOSPADM

## 2023-05-01 RX ORDER — DEXAMETHASONE SODIUM PHOSPHATE 10 MG/ML
INJECTION, EMULSION INTRAMUSCULAR; INTRAVENOUS PRN
Status: DISCONTINUED | OUTPATIENT
Start: 2023-05-01 | End: 2023-05-01 | Stop reason: SDUPTHER

## 2023-05-01 RX ORDER — FENTANYL CITRATE 50 UG/ML
INJECTION, SOLUTION INTRAMUSCULAR; INTRAVENOUS PRN
Status: DISCONTINUED | OUTPATIENT
Start: 2023-05-01 | End: 2023-05-01 | Stop reason: SDUPTHER

## 2023-05-01 RX ORDER — SODIUM CHLORIDE, SODIUM LACTATE, POTASSIUM CHLORIDE, CALCIUM CHLORIDE 600; 310; 30; 20 MG/100ML; MG/100ML; MG/100ML; MG/100ML
INJECTION, SOLUTION INTRAVENOUS SEE ADMIN INSTRUCTIONS
Status: DISCONTINUED | OUTPATIENT
Start: 2023-05-01 | End: 2023-05-01 | Stop reason: HOSPADM

## 2023-05-01 RX ORDER — ONDANSETRON 4 MG/1
4 TABLET, ORALLY DISINTEGRATING ORAL EVERY 8 HOURS PRN
Status: DISCONTINUED | OUTPATIENT
Start: 2023-05-01 | End: 2023-05-01 | Stop reason: HOSPADM

## 2023-05-01 RX ORDER — OXYCODONE HYDROCHLORIDE 5 MG/1
10 TABLET ORAL EVERY 4 HOURS PRN
Status: DISCONTINUED | OUTPATIENT
Start: 2023-05-01 | End: 2023-05-01 | Stop reason: HOSPADM

## 2023-05-01 RX ORDER — ACETAMINOPHEN 325 MG/1
650 TABLET ORAL EVERY 6 HOURS PRN
Qty: 120 TABLET | Refills: 3 | COMMUNITY
Start: 2023-05-01

## 2023-05-01 RX ORDER — IBUPROFEN 600 MG/1
600 TABLET ORAL EVERY 6 HOURS PRN
Qty: 30 TABLET | Refills: 1 | COMMUNITY
Start: 2023-05-01

## 2023-05-01 RX ORDER — KETOROLAC TROMETHAMINE 30 MG/ML
INJECTION, SOLUTION INTRAMUSCULAR; INTRAVENOUS PRN
Status: DISCONTINUED | OUTPATIENT
Start: 2023-05-01 | End: 2023-05-01 | Stop reason: SDUPTHER

## 2023-05-01 RX ORDER — ONDANSETRON 2 MG/ML
8 INJECTION INTRAMUSCULAR; INTRAVENOUS EVERY 8 HOURS PRN
Status: DISCONTINUED | OUTPATIENT
Start: 2023-05-01 | End: 2023-05-01 | Stop reason: HOSPADM

## 2023-05-01 RX ORDER — IBUPROFEN 200 MG
600 TABLET ORAL EVERY 8 HOURS PRN
Status: DISCONTINUED | OUTPATIENT
Start: 2023-05-01 | End: 2023-05-01 | Stop reason: HOSPADM

## 2023-05-01 RX ORDER — MORPHINE SULFATE 2 MG/ML
2 INJECTION, SOLUTION INTRAMUSCULAR; INTRAVENOUS
Status: DISCONTINUED | OUTPATIENT
Start: 2023-05-01 | End: 2023-05-01 | Stop reason: HOSPADM

## 2023-05-01 RX ORDER — ACETAMINOPHEN 325 MG/1
650 TABLET ORAL ONCE
Status: DISCONTINUED | OUTPATIENT
Start: 2023-05-01 | End: 2023-05-01 | Stop reason: HOSPADM

## 2023-05-01 RX ORDER — ONDANSETRON 2 MG/ML
INJECTION INTRAMUSCULAR; INTRAVENOUS PRN
Status: DISCONTINUED | OUTPATIENT
Start: 2023-05-01 | End: 2023-05-01 | Stop reason: SDUPTHER

## 2023-05-01 RX ADMIN — ACETAMINOPHEN 1000 MG: 500 TABLET ORAL at 10:29

## 2023-05-01 RX ADMIN — DEXAMETHASONE SODIUM PHOSPHATE 8 MG: 10 INJECTION, EMULSION INTRAMUSCULAR; INTRAVENOUS at 09:22

## 2023-05-01 RX ADMIN — LIDOCAINE HYDROCHLORIDE 80 MG: 20 INJECTION, SOLUTION EPIDURAL; INFILTRATION; INTRACAUDAL; PERINEURAL at 09:16

## 2023-05-01 RX ADMIN — ONDANSETRON 4 MG: 2 INJECTION INTRAMUSCULAR; INTRAVENOUS at 09:38

## 2023-05-01 RX ADMIN — KETOROLAC TROMETHAMINE 30 MG: 30 INJECTION, SOLUTION INTRAMUSCULAR; INTRAVENOUS at 09:36

## 2023-05-01 RX ADMIN — Medication 1000 MG: at 10:29

## 2023-05-01 RX ADMIN — FENTANYL CITRATE 100 MCG: 50 INJECTION, SOLUTION INTRAMUSCULAR; INTRAVENOUS at 09:16

## 2023-05-01 RX ADMIN — SODIUM CHLORIDE: 9 INJECTION, SOLUTION INTRAVENOUS at 09:14

## 2023-05-01 RX ADMIN — PROPOFOL 150 MG: 10 INJECTION, EMULSION INTRAVENOUS at 09:16

## 2023-05-01 ASSESSMENT — PAIN - FUNCTIONAL ASSESSMENT: PAIN_FUNCTIONAL_ASSESSMENT: 0-10

## 2023-05-01 ASSESSMENT — PAIN DESCRIPTION - DESCRIPTORS: DESCRIPTORS: CRAMPING

## 2023-05-01 ASSESSMENT — PAIN SCALES - GENERAL
PAINLEVEL_OUTOF10: 0
PAINLEVEL_OUTOF10: 3

## 2023-05-01 ASSESSMENT — PAIN DESCRIPTION - ORIENTATION: ORIENTATION: LOWER

## 2023-05-01 ASSESSMENT — PAIN DESCRIPTION - LOCATION: LOCATION: ABDOMEN

## 2023-05-01 NOTE — PROGRESS NOTES
947- Pt arrived to the PACU. Pt arousable to calling and denies any pain at this time. 956- Pt resting peacefully at this time. VSS    1004- Pt more awake at this time. Still denies any pain. VSS. 1017- Pt meet criteria to be discharged from PACU. Pt taken to Phase 2.    1018- Pt in Phase 2 at this time. 1025- Pt eating and drinking fluids at this time. 1028- Pt complained of cramping in her stomach. Medicated per MAR    0572- Pt up to the bathroom at this time. 1052- This RN went over discharge instructions with the pt and her . Both the pt and her  both verbalized understanding. 1100- Patient meets discharge criteria. Discharged in stable condition. Patient brought to car via wheelchair with assistance from RN. Patient tolerated well. All belongings sent with patient.

## 2023-05-01 NOTE — ANESTHESIA PRE PROCEDURE
Department of Anesthesiology  Preprocedure Note       Name:  Tomer Weber   Age:  46 y.o.  :  1970                                          MRN:  [id number]         Date:  2023      Surgeon: Justin Howe):  Alexandra Norris MD    Procedure: Procedure(s):  Hysteroscopy D&C    Medications prior to admission:   Prior to Admission medications    Medication Sig Start Date End Date Taking? Authorizing Provider   vitamin D (ERGOCALCIFEROL) 1.25 MG (79130 UT) CAPS capsule Take 1 capsule by mouth once a week 22   Garrett Linares,    atorvastatin (LIPITOR) 40 MG tablet Take 1 tablet by mouth daily 22   QUAN Chung - CNP       Current medications:    Current Facility-Administered Medications   Medication Dose Route Frequency Provider Last Rate Last Admin    lactated ringers IV soln infusion   IntraVENous Continuous Alexandra Norris MD        sodium chloride flush 0.9 % injection 5-40 mL  5-40 mL IntraVENous 2 times per day Alexandra Norris MD        sodium chloride flush 0.9 % injection 5-40 mL  5-40 mL IntraVENous PRN Alexandra Norris MD        0.9 % sodium chloride infusion   IntraVENous PRN Alexandra Norris MD        ondansetron [facility]) injection 8 mg  8 mg IntraVENous Q8H PRN Alexandra Norris MD           Allergies:  No Known Allergies    Problem List:  There is no problem list on file for this patient.       Past Medical History:        Diagnosis Date    Cancer ([facility] [address]     skin    Hyperlipidemia        Past Surgical History:        Procedure Laterality Date    COLONOSCOPY      last one 2020??    DENTAL SURGERY      tumor along gum line  full mouth extraction   ??    LEG SURGERY Left     Nerve tangled     ??    MOUTH BIOPSY      SKIN BIOPSY      TUBAL LIGATION         Social History:    Social History     Tobacco Use    Smoking status: Never     Passive exposure: Past (father & aunts)    Smokeless tobacco: Never   Substance

## 2023-05-01 NOTE — ANESTHESIA POSTPROCEDURE EVALUATION
Department of Anesthesiology  Postprocedure Note    Patient: Evelyn Nicholas  MRN: [id number]  YOB: 1970  Date of evaluation: 5/1/2023      Procedure Summary     Date: 05/01/23 Room / Location: [address] [place] [address] 01 / [address] [facility]    Anesthesia Start: 1573 Anesthesia Stop: 5128    Procedure: Hysteroscopy D&C Diagnosis:       Metrorrhagia      (Metrorrhagia [N92.1])    Surgeons: Anu Bowling MD Responsible Provider: Marissa Diamond DO    Anesthesia Type: general ASA Status: 1          Anesthesia Type: No value filed.     Shilpa Phase I: Shilpa Score: 9    Shilpa Phase II: Shilpa Score: 10      Anesthesia Post Evaluation    Patient location during evaluation: PACU  Patient participation: complete - patient participated  Level of consciousness: awake  Airway patency: patent  Nausea & Vomiting: no nausea  Complications: no  Cardiovascular status: hemodynamically stable  Respiratory status: acceptable  Hydration status: stable

## 2023-05-01 NOTE — H&P
Department of Obstetrics and Gynecology   History & Physical    Pt Name: Tripp Li  MRN: [id number] Cori #: [de-identified]  YOB: 1970    HPI: The patient is a 46 y.o.  female  who presents today for hysteroscopy dilation and curettage for abnormal uterine bleeding, menometrorrhagia and failed EMB. She presented to the office c/o heavy and frequent periods. She is concerned as she has a family h/o uterine and cervical cancer. The decision was made to proceed with surgical evaluation for pathology. Allergies: Allergies as of 03/15/2023    (No Known Allergies)       Medications:    Current Facility-Administered Medications:     lactated ringers IV soln infusion, , IntraVENous, Continuous, Gabe Smith MD    sodium chloride flush 0.9 % injection 5-40 mL, 5-40 mL, IntraVENous, 2 times per day, Yessica Heaton MD    sodium chloride flush 0.9 % injection 5-40 mL, 5-40 mL, IntraVENous, PRN, Gabe Smith MD    0.9 % sodium chloride infusion, , IntraVENous, PRN, Yessica Heaton MD    ondansetron [facility] injection 8 mg, 8 mg, IntraVENous, Q8H PRN, Yessica Heaton MD    OB History: [id number] SVDs    Gyn History: Denies h/o abnormal pap smear, h/o STDs. Past Medical History:   Past Medical History:   Diagnosis Date    Cancer [facility])     mole removed non cancerous per pt    Hyperlipidemia        Past Surgical History:   Past Surgical History:   Procedure Laterality Date    COLONOSCOPY      last one ??    DENTAL SURGERY      tumor along gum line  full mouth extraction   ??    LEG SURGERY Left     Nerve tangled     ??    MOUTH BIOPSY      SKIN BIOPSY      TUBAL LIGATION         Social History:   Social History     Tobacco Use   Smoking Status Never    Passive exposure: Past (father & aunts)   Smokeless Tobacco Never        Family History: Noncontributory; Denies h/o cancer.      ROS:  Negative except as stated in HPI    PE:  Vitals:

## 2023-05-01 NOTE — DISCHARGE INSTRUCTIONS
Discharge Instructions  Call Surgeon if you have:  Temperature greater than 100.4  Persistent nausea and vomiting  Severe uncontrolled pain  Redness, tenderness, or signs of infection (pain, swelling, redness, odor or green/yellow discharge around the site)  Difficulty breathing, headache or visual disturbances  Hives  Persistent dizziness or light-headedness  Extreme fatigue  Any other questions or concerns you may have after discharge  Heavy vaginal bleeding-soaking 1 or more pads per hour or passing large clots    In an emergency, call 911 or go to an Emergency Department at a nearby hospital    It is important to bring a complete, current list of your medications to any medical appointments or hospitalizations. REMINDER:   Carry a list of your medications and allergies with you at all times  Call your pharmacy at least 1 week in advance to refill prescriptions    Diet: Resume your usual diet. Good nutrition promotes healing. Increase fluid intake. Activity:   -Nothing in the vagina for 1 week-no sex, no tampons, no douching  -no heavy lifting for 1 week  -No driving today or while taking narcotics or until you can step on the break quickly without any pain      Medications:   -resume your home medications  -tyenol and motrin over the counter for mild to moderate pain-call your doctor if your pain is not controlled with these medications        Follow up 1 week . Call for appointment if not already made. I understand and acknowledge receipt of the above instructions.                                                                                                                                           Patient or Guardian Signature                                                         Date/Time                                                                                                                                            Physician's or R.N.'s Signature

## 2023-05-01 NOTE — OP NOTE
Gyn Service    Operative Report      Pt Name: Verena Valdez  MRN: [id number] [place] #: [de-identified]  YOB: 1970  Procedure Performed By: Guy Maxwell MD, MD        Pre-operative Diagnosis:  abnormal uterine bleeding, menometrorrhagia, failed EMB    Post-operative Diagnosis:  abnormal uterine bleeding, menometrorrhagia, failed EMB     Procedure:  HYSTEROSCOPY DILATION AND CURETTAGE    Surgeon:  Guy Maxwell MD    Anesthesia:  GENERAL     Estimated blood loss:   25 cc    Fluids: see anesthesia record for details    Urine: no rodrigez inserted during procedure; bladder drained with prep    Findings:  Normal external genitalia. Small, mobile anteverted uterus. No adnexal masses were palpated. Smooth endometrial and endocervical cavity. Bilateral tubal ostia. Complications:  NONE    Pathology:  Endometrial curettings    Indications: The patient is a 46 y.o.  female  who presents today for hysteroscopy dilation and curettage for abnormal uterine bleeding, menometrorrhagia and failed EMB. She presented to the office c/o heavy and frequent periods. She is concerned as she has a family h/o uterine and cervical cancer. The decision was made to proceed with surgical evaluation for pathology. The decision was made to proceed with a hysteroscopy, dilation and curettage. All risks, benefits and alternatives to the procedure were discussed in detail including but not limited to bleeding, infection, and uterine perforation. All questions were answered and the consent was signed. Procedure: The patient was taken to the operating room where general anesthesia was initiated. She was placed in the dorsal lithotomy position and prepped and draped in the normal sterile fashion. A bimanual exam was performed noting a small anteverted uterus. A weighted speculum was placed in the vagina and a Nate retractor was used to visualize the cervix.  The anterior lip of the cervix was grasped with a

## 2023-11-17 ENCOUNTER — APPOINTMENT (OUTPATIENT)
Dept: GENERAL RADIOLOGY | Age: 53
End: 2023-11-17
Payer: COMMERCIAL

## 2023-11-17 ENCOUNTER — HOSPITAL ENCOUNTER (EMERGENCY)
Age: 53
Discharge: HOME | End: 2023-11-17
Payer: COMMERCIAL

## 2023-11-17 VITALS
RESPIRATION RATE: 17 BRPM | TEMPERATURE: 97.7 F | SYSTOLIC BLOOD PRESSURE: 131 MMHG | OXYGEN SATURATION: 96 % | DIASTOLIC BLOOD PRESSURE: 90 MMHG | HEART RATE: 92 BPM

## 2023-11-17 DIAGNOSIS — S86.001A ACHILLES TENDON INJURY, RIGHT, INITIAL ENCOUNTER: ICD-10-CM

## 2023-11-17 DIAGNOSIS — S82.891A CLOSED FRACTURE OF RIGHT ANKLE, INITIAL ENCOUNTER: Primary | ICD-10-CM

## 2023-11-17 PROCEDURE — 99283 EMERGENCY DEPT VISIT LOW MDM: CPT

## 2023-11-17 PROCEDURE — 73610 X-RAY EXAM OF ANKLE: CPT

## 2023-11-17 PROCEDURE — 29515 APPLICATION SHORT LEG SPLINT: CPT

## 2023-11-17 PROCEDURE — 6370000000 HC RX 637 (ALT 250 FOR IP)

## 2023-11-17 RX ORDER — NAPROXEN 250 MG/1
250 TABLET ORAL 2 TIMES DAILY WITH MEALS
Qty: 60 TABLET | Refills: 0 | Status: SHIPPED | OUTPATIENT
Start: 2023-11-17

## 2023-11-17 RX ORDER — IBUPROFEN 200 MG
400 TABLET ORAL ONCE
Status: COMPLETED | OUTPATIENT
Start: 2023-11-17 | End: 2023-11-17

## 2023-11-17 RX ADMIN — IBUPROFEN 400 MG: 200 TABLET, FILM COATED ORAL at 09:49

## 2023-11-17 ASSESSMENT — PAIN SCALES - GENERAL
PAINLEVEL_OUTOF10: 9
PAINLEVEL_OUTOF10: 9

## 2023-11-17 ASSESSMENT — PAIN - FUNCTIONAL ASSESSMENT: PAIN_FUNCTIONAL_ASSESSMENT: 0-10

## 2023-11-17 ASSESSMENT — PAIN DESCRIPTION - LOCATION
LOCATION: ANKLE
LOCATION: ANKLE

## 2023-11-17 ASSESSMENT — PAIN DESCRIPTION - ORIENTATION: ORIENTATION: RIGHT

## 2023-11-17 NOTE — ED NOTES
Pt to ED c/o stepped wrong at work and rolled her right ankle. Pt reports hearing a cracking noise. Pt respirations unlabored.       Sabino Freed, [address]  11/17/23 0076

## 2023-11-17 NOTE — ED NOTES
According to our [employer] her employer does not require her to have a urine drug or alcohol screening done.       Lucy Lucas LPN  30/88/95 [id number]

## 2023-11-17 NOTE — ED PROVIDER NOTES
[address] [facility] EMERGENCY DEPT      EMERGENCY MEDICINE     Pt Name: Casandra Li  MRN: [id number]  [address] [facility] 1970  Date of evaluation: 11/17/2023  Provider: QUAN Patel CNP    CHIEF COMPLAINT       Chief Complaint   Patient presents with    Ankle Injury     HISTORY OF PRESENT ILLNESS   Casandra Li is a pleasant 48 y.o. female who presents to the emergency department from  work  by personal transportation for right ankle pain. Was descending steps when she came to last up she lost her footing, twisted on her ankle, ankle inverted and \"put my whole weight onto my leg and heard a cracking noise. \"  Endorses historical injury where she \"broke my ankle in 1984. \"  Denies needing surgical intervention at that time or other issue with this ankle. States she was not able to bear weight immediately following injury and not able to bear weight here in ER. History obtained from patient  PASTMEDICAL HISTORY     Past Medical History:   Diagnosis Date    Cancer [facility])     mole removed non cancerous per pt    Hyperlipidemia        There is no problem list on file for this patient.     SURGICAL HISTORY       Past Surgical History:   Procedure Laterality Date    COLONOSCOPY      last one 2020??    DENTAL SURGERY      tumor along gum line  full mouth extraction   1998??    DILATION AND CURETTAGE OF UTERUS N/A 5/1/2023    Hysteroscopy D&C performed by Karma Cabot, MD at [address] [place] [address] Left     Nerve tangled     2003??    MOUTH BIOPSY      SKIN BIOPSY      TUBAL LIGATION  1991       CURRENT MEDICATIONS       Discharge Medication List as of 11/17/2023  9:46 AM        CONTINUE these medications which have NOT CHANGED    Details   ibuprofen (ADVIL;MOTRIN) 600 MG tablet Take 1 tablet by mouth every 6 hours as needed for Pain, Disp-30 tablet, R-1OTC      acetaminophen (AMINOFEN) 325 MG tablet Take 2 tablets by mouth every 6 hours as needed for Pain, Disp-120 tablet, R-3OTC

## 2023-11-17 NOTE — DISCHARGE INSTRUCTIONS
Please go straight to orthopedic urgent care now for additional evaluation/management. They will test you for potential Achilles injury and treat your appropriately. Additionally, they will provide management of your potential small avulsion fracture discovered on x-rays today. Please attend your occupational health follow-up, there is been an appointment made for you. Please get cleared from occupational health prior to returning to work. Please treat your pain/swelling with ice packs and naproxen as ordered. I hope you are feeling better soon!

## 2024-01-10 ENCOUNTER — HOSPITAL ENCOUNTER (OUTPATIENT)
Dept: MRI IMAGING | Age: 54
Discharge: HOME | End: 2024-01-10
Payer: COMMERCIAL

## 2024-01-10 DIAGNOSIS — S93.401A SPRAIN OF RIGHT ANKLE, INITIAL ENCOUNTER: ICD-10-CM

## 2024-01-10 PROCEDURE — 73721 MRI JNT OF LWR EXTRE W/O DYE: CPT

## 2024-10-03 NOTE — TELEPHONE ENCOUNTER
October 3, 2024     Patient: Waylon Urbano   YOB: 2010   Date of Visit: 10/3/2024       To Whom It May Concern:    Waylon Urbano is a longstanding patient in my practice.  He has a history of motion sickness and will, on occasion develop nausea and vomiting.  We have discussed ways to mitigate this, but, I request that if he arrives at school feeling nauseated and vomits, he be given an opportunity to decide whether or not this is related to motion sickness or true infectious illness before being sent home.      If you have any questions or concerns, please don't hesitate to call.         Sincerely,         Wesley Dunbar MD        CC: No Recipients   Plastic surgery called. Appointment made for patient. Patient called and notified of appointment. States understanding. No questions or concerns noted at this time.

## 2024-12-10 ENCOUNTER — OFFICE VISIT (OUTPATIENT)
Dept: FAMILY MEDICINE CLINIC | Age: 54
End: 2024-12-10

## 2024-12-10 VITALS
HEIGHT: 62 IN | WEIGHT: 157.4 LBS | BODY MASS INDEX: 28.97 KG/M2 | HEART RATE: 76 BPM | RESPIRATION RATE: 12 BRPM | SYSTOLIC BLOOD PRESSURE: 120 MMHG | DIASTOLIC BLOOD PRESSURE: 70 MMHG

## 2024-12-10 DIAGNOSIS — Z12.31 ENCOUNTER FOR SCREENING MAMMOGRAM FOR MALIGNANT NEOPLASM OF BREAST: ICD-10-CM

## 2024-12-10 DIAGNOSIS — E78.5 HYPERLIPIDEMIA, UNSPECIFIED HYPERLIPIDEMIA TYPE: ICD-10-CM

## 2024-12-10 DIAGNOSIS — Z00.00 WELLNESS EXAMINATION: Primary | ICD-10-CM

## 2024-12-10 SDOH — ECONOMIC STABILITY: INCOME INSECURITY: HOW HARD IS IT FOR YOU TO PAY FOR THE VERY BASICS LIKE FOOD, HOUSING, MEDICAL CARE, AND HEATING?: HARD

## 2024-12-10 SDOH — ECONOMIC STABILITY: FOOD INSECURITY: WITHIN THE PAST 12 MONTHS, THE FOOD YOU BOUGHT JUST DIDN'T LAST AND YOU DIDN'T HAVE MONEY TO GET MORE.: NEVER TRUE

## 2024-12-10 SDOH — ECONOMIC STABILITY: FOOD INSECURITY: WITHIN THE PAST 12 MONTHS, YOU WORRIED THAT YOUR FOOD WOULD RUN OUT BEFORE YOU GOT MONEY TO BUY MORE.: NEVER TRUE

## 2024-12-10 ASSESSMENT — ENCOUNTER SYMPTOMS
SORE THROAT: 0
WHEEZING: 0
CONSTIPATION: 0
NAUSEA: 0
TROUBLE SWALLOWING: 0
DIARRHEA: 0
CHEST TIGHTNESS: 0
VOICE CHANGE: 0
ABDOMINAL PAIN: 0
VOMITING: 0
COUGH: 0
RHINORRHEA: 0
SINUS PRESSURE: 0
SHORTNESS OF BREATH: 0

## 2024-12-10 ASSESSMENT — PATIENT HEALTH QUESTIONNAIRE - PHQ9
SUM OF ALL RESPONSES TO PHQ QUESTIONS 1-9: 0
SUM OF ALL RESPONSES TO PHQ QUESTIONS 1-9: 0
1. LITTLE INTEREST OR PLEASURE IN DOING THINGS: NOT AT ALL
2. FEELING DOWN, DEPRESSED OR HOPELESS: NOT AT ALL
SUM OF ALL RESPONSES TO PHQ QUESTIONS 1-9: 0
SUM OF ALL RESPONSES TO PHQ9 QUESTIONS 1 & 2: 0
SUM OF ALL RESPONSES TO PHQ QUESTIONS 1-9: 0

## 2024-12-10 NOTE — PROGRESS NOTES
patient.  All patient questionsanswered.  Pt voiced understanding.  Instructed to continue current medications,diet and exercise.  Patient agreed with treatment plan.     Previous Notes, Consultations Notes, previous Laboratories available were reviewed with the patient during this visit:

## 2024-12-13 ASSESSMENT — ENCOUNTER SYMPTOMS: BACK PAIN: 1

## 2024-12-14 ENCOUNTER — LAB (OUTPATIENT)
Dept: LAB | Age: 54
End: 2024-12-14

## 2024-12-14 DIAGNOSIS — Z00.00 WELLNESS EXAMINATION: ICD-10-CM

## 2024-12-14 LAB
ALBUMIN SERPL BCG-MCNC: 3.9 G/DL (ref 3.5–5.1)
ALP SERPL-CCNC: 100 U/L (ref 38–126)
ALT SERPL W/O P-5'-P-CCNC: 12 U/L (ref 11–66)
ANION GAP SERPL CALC-SCNC: 11 MEQ/L (ref 8–16)
AST SERPL-CCNC: 15 U/L (ref 5–40)
BASOPHILS ABSOLUTE: 0 THOU/MM3 (ref 0–0.1)
BASOPHILS NFR BLD AUTO: 0.5 %
BILIRUB SERPL-MCNC: 0.5 MG/DL (ref 0.3–1.2)
BUN SERPL-MCNC: 16 MG/DL (ref 7–22)
CALCIUM SERPL-MCNC: 9.5 MG/DL (ref 8.5–10.5)
CHLORIDE SERPL-SCNC: 103 MEQ/L (ref 98–111)
CHOLEST SERPL-MCNC: 240 MG/DL (ref 100–199)
CO2 SERPL-SCNC: 25 MEQ/L (ref 23–33)
CREAT SERPL-MCNC: 0.9 MG/DL (ref 0.4–1.2)
DEPRECATED RDW RBC AUTO: 42.3 FL (ref 35–45)
EOSINOPHIL NFR BLD AUTO: 2.5 %
EOSINOPHILS ABSOLUTE: 0.1 THOU/MM3 (ref 0–0.4)
ERYTHROCYTE [DISTWIDTH] IN BLOOD BY AUTOMATED COUNT: 13.2 % (ref 11.5–14.5)
GFR SERPL CREATININE-BSD FRML MDRD: 76 ML/MIN/1.73M2
GLUCOSE SERPL-MCNC: 97 MG/DL (ref 70–108)
HCT VFR BLD AUTO: 48 % (ref 37–47)
HDLC SERPL-MCNC: 44 MG/DL
HGB BLD-MCNC: 15.5 GM/DL (ref 12–16)
IMM GRANULOCYTES # BLD AUTO: 0.01 THOU/MM3 (ref 0–0.07)
IMM GRANULOCYTES NFR BLD AUTO: 0.2 %
LDLC SERPL CALC-MCNC: 182 MG/DL
LYMPHOCYTES ABSOLUTE: 1.7 THOU/MM3 (ref 1–4.8)
LYMPHOCYTES NFR BLD AUTO: 30.9 %
MCH RBC QN AUTO: 28.3 PG (ref 26–33)
MCHC RBC AUTO-ENTMCNC: 32.3 GM/DL (ref 32.2–35.5)
MCV RBC AUTO: 87.8 FL (ref 81–99)
MONOCYTES ABSOLUTE: 0.7 THOU/MM3 (ref 0.4–1.3)
MONOCYTES NFR BLD AUTO: 12.6 %
NEUTROPHILS ABSOLUTE: 2.9 THOU/MM3 (ref 1.8–7.7)
NEUTROPHILS NFR BLD AUTO: 53.3 %
NRBC BLD AUTO-RTO: 0 /100 WBC
PLATELET # BLD AUTO: 313 THOU/MM3 (ref 130–400)
PMV BLD AUTO: 10.2 FL (ref 9.4–12.4)
POTASSIUM SERPL-SCNC: 4 MEQ/L (ref 3.5–5.2)
PROT SERPL-MCNC: 7.9 G/DL (ref 6.1–8)
RBC # BLD AUTO: 5.47 MILL/MM3 (ref 4.2–5.4)
SODIUM SERPL-SCNC: 139 MEQ/L (ref 135–145)
TRIGL SERPL-MCNC: 69 MG/DL (ref 0–199)
WBC # BLD AUTO: 5.5 THOU/MM3 (ref 4.8–10.8)

## 2024-12-17 ENCOUNTER — TELEPHONE (OUTPATIENT)
Dept: FAMILY MEDICINE CLINIC | Age: 54
End: 2024-12-17

## 2024-12-17 DIAGNOSIS — I77.9 LEFT-SIDED CAROTID ARTERY DISEASE, UNSPECIFIED TYPE (HCC): ICD-10-CM

## 2024-12-17 DIAGNOSIS — E78.00 HYPERCHOLESTEROLEMIA: ICD-10-CM

## 2024-12-17 RX ORDER — ATORVASTATIN CALCIUM 40 MG/1
40 TABLET, FILM COATED ORAL DAILY
Qty: 90 TABLET | Refills: 1 | Status: SHIPPED | OUTPATIENT
Start: 2024-12-17

## 2024-12-17 NOTE — TELEPHONE ENCOUNTER
----- Message from Dr. Marcell Savage MD sent at 12/17/2024 10:34 AM EST -----  Please call patient recent laboratory showed cholesterol 240 and LDL of 182 both were high.  Will prescribe back to Lipitor and repeated in 6 months.  The rest of the laboratories are all within normal limit kidney liver complete blood count are all normal.  Needs to have her mammogram

## 2025-01-15 ENCOUNTER — HOSPITAL ENCOUNTER (OUTPATIENT)
Dept: WOMENS IMAGING | Age: 55
Discharge: HOME | End: 2025-01-15
Attending: EMERGENCY MEDICINE
Payer: COMMERCIAL

## 2025-01-15 DIAGNOSIS — Z12.31 ENCOUNTER FOR SCREENING MAMMOGRAM FOR MALIGNANT NEOPLASM OF BREAST: ICD-10-CM

## 2025-01-15 PROCEDURE — 77063 BREAST TOMOSYNTHESIS BI: CPT

## 2025-01-22 ENCOUNTER — HOSPITAL ENCOUNTER (OUTPATIENT)
Dept: WOMENS IMAGING | Age: 55
Discharge: HOME | End: 2025-01-22
Attending: RADIOLOGY
Payer: COMMERCIAL

## 2025-01-22 DIAGNOSIS — R92.8 ABNORMAL MAMMOGRAM: ICD-10-CM

## 2025-01-22 PROCEDURE — G0279 TOMOSYNTHESIS, MAMMO: HCPCS

## 2025-01-22 PROCEDURE — 76642 ULTRASOUND BREAST LIMITED: CPT

## 2025-01-29 ENCOUNTER — HOSPITAL ENCOUNTER (OUTPATIENT)
Dept: WOMENS IMAGING | Age: 55
Discharge: HOME | End: 2025-01-29
Attending: RADIOLOGY
Payer: COMMERCIAL

## 2025-01-29 DIAGNOSIS — N63.11 MASS OF UPPER OUTER QUADRANT OF RIGHT BREAST: ICD-10-CM

## 2025-01-29 DIAGNOSIS — R59.0 ENLARGED LYMPH NODES IN ARMPIT: ICD-10-CM

## 2025-01-29 PROCEDURE — 88305 TISSUE EXAM BY PATHOLOGIST: CPT

## 2025-01-29 PROCEDURE — G0279 TOMOSYNTHESIS, MAMMO: HCPCS

## 2025-01-29 PROCEDURE — 38505 NEEDLE BIOPSY LYMPH NODES: CPT

## 2025-01-29 PROCEDURE — 2709999900 MAM NEEDLE BIOPSY LYMPH NODE SUPERFICIAL

## 2025-01-29 PROCEDURE — 88341 IMHCHEM/IMCYTCHM EA ADD ANTB: CPT

## 2025-01-29 PROCEDURE — C1894 INTRO/SHEATH, NON-LASER: HCPCS

## 2025-01-29 PROCEDURE — 88342 IMHCHEM/IMCYTCHM 1ST ANTB: CPT

## 2025-01-29 PROCEDURE — 88360 TUMOR IMMUNOHISTOCHEM/MANUAL: CPT

## 2025-01-29 PROCEDURE — 76942 ECHO GUIDE FOR BIOPSY: CPT

## 2025-01-29 NOTE — PROGRESS NOTES
Breast Biopsy Flowsheet/Post-Operative Care    Date of Procedure: 1/29/2025  Physician: Dr. Xie  Technologist: Tati Ogden RT(R)(M)    Biopsy:ultrasound guided breast biopsy X 3  Lesion type: Non-palpable  Breast: right    Clock face position: Site #1: UOQ     Site #2: axillary tail     Site #3 axilla    Primary Method of Detection: Mammogram      Microcalcification's: no   Distribution: N/A        Biopsy Method:   Sertera:    Site # 1    Gauge: 14    # of Passes: 5     Clip: Barbell    Sertera:    Site # 2    Gauge: 14    # of Passes: 4     Clip:  U      Sertera:    Site # 3    Gauge: 14    # of Passes: 4     Clip: Tribell    Pre-Op Assessment: (BI-RADS)   5. Highly Suggestive of Malignancy    Patient Tolerated Procedure: good  Complications: n/a  Comments: n/a    Post Operative Care  Steri strips: Yes  Dressing: Gauze, Tape   Ice Applied to Site:  No  Evidence of Bleeding:  No    Pain Verbalized: No      Written Discharge Instructions: Yes  Condition at Discharge: good  Time of Discharge: 0916    Electronically signed by Sherron Moon on 1/29/2025 at 9:15 AM

## 2025-01-29 NOTE — PROGRESS NOTES
[facility]  Pre-Biopsy Assessment      Patient Education    Written information about procedure Yes  right   Procedural steps explained Yes Ultrasound Biopsy   Post-op potential: bruising, hematoma, pain Yes    Self-care: activity, care of dressing Yes    Patient verbalized understanding Yes    Consent signed and witnessed Yes      Hormone Therapy Status: n/a    Recent Medication: N/A Last Dose: n/a                                     Hormone Replacement Therapy: no    Previous Breast Biopsy: no    Previous Diagnosis Cancer: no    Hysterectomy:no    Emotional Status: Calm    Language or Physical Barriers: n/a    Comments: n/a      Electronically signed by Sherron Moon on 1/29/2025 at 8:18 AM

## 2025-01-30 ENCOUNTER — CLINICAL DOCUMENTATION (OUTPATIENT)
Dept: WOMENS IMAGING | Age: 55
End: 2025-01-30

## 2025-01-30 NOTE — PROGRESS NOTES
Contact Type: [facility]    Diagnosis:  Invasive Ductal Carcinoma    Home Disposition: Lives with     Contact Information: Arrived with  for biopsy results. Dr. Xie discussed pathology and recommended integrated breast clinic.  Encouraged Breast Clinic attendance.  All cards given for surgeons.  Wants to see Dr(s): Undecided.      Patient Expresses Need(s) For: n/a    Requests Referral to Doctor(s) with appointment(s): n/a    Additional Referral(s): Jory Balbuena/Mikaela Chavarria: [facility]     Integrated breast cancer clinic appointment: 1-31-25 @ 7:50    Biopsy site status: Jazmin is sore    Teaching Sheets provided: Cancer Type: Invasive Ductal Carcinoma, MRI, What is Breast Cancer, Tumor Markers, Needle Localization, Gaffney Lymph Node Surgery, Lumpectomy/Mastectomy Comparison, Radiation Therapy, Breast Cancer Navigation Packet, Nurse Navigation contact information, Breast Clinic appointment and map.    Currently on HRT: no    If yes, was patient instructed to stop immediately: N/A     Notes: Explained terms doctor and navigators will discuss at next appointments. Questions addressed and encouraged patient to ask [facility]. Will meet nurse navigator at clinic tomorrow. Referral emailed to Navigation.    Answered “yes” on Genetic Risk Assessment: Yes     Additional information: Best contact number [phone]    Results Faxed to:  Dr. Savage

## 2025-01-31 ENCOUNTER — PATIENT MESSAGE (OUTPATIENT)
Dept: FAMILY MEDICINE CLINIC | Age: 55
End: 2025-01-31

## 2025-01-31 ENCOUNTER — CLINICAL DOCUMENTATION (OUTPATIENT)
Dept: CASE MANAGEMENT | Age: 55
End: 2025-01-31

## 2025-01-31 ENCOUNTER — TELEPHONE (OUTPATIENT)
Dept: FAMILY MEDICINE CLINIC | Age: 55
End: 2025-01-31

## 2025-01-31 DIAGNOSIS — C50.919 TRIPLE NEGATIVE BREAST CARCINOMA (HCC): Primary | ICD-10-CM

## 2025-01-31 DIAGNOSIS — C50.411 MALIGNANT NEOPLASM OF UPPER-OUTER QUADRANT OF RIGHT BREAST IN FEMALE, ESTROGEN RECEPTOR NEGATIVE (HCC): Primary | ICD-10-CM

## 2025-01-31 DIAGNOSIS — F41.9 ANXIETY: Primary | ICD-10-CM

## 2025-01-31 DIAGNOSIS — Z17.421 TRIPLE NEGATIVE BREAST CARCINOMA (HCC): Primary | ICD-10-CM

## 2025-01-31 DIAGNOSIS — Z17.1 MALIGNANT NEOPLASM OF UPPER-OUTER QUADRANT OF RIGHT BREAST IN FEMALE, ESTROGEN RECEPTOR NEGATIVE (HCC): Primary | ICD-10-CM

## 2025-01-31 RX ORDER — LORAZEPAM 1 MG/1
TABLET ORAL
Qty: 4 TABLET | Refills: 0 | Status: SHIPPED | OUTPATIENT
Start: 2025-01-31 | End: 2025-02-08

## 2025-01-31 NOTE — PROGRESS NOTES
Name: Mariana Lowery  : 1970  MRN: [id number]  Oncology Navigation Follow-Up Note     Contact Type:  Medical Oncology for genetic lab draw     Notes: Obtained [facility] genetic blood sample from lab dept. Verified label with patient's information per protocol. Packaged for shipping with demographics/insurance, pathology, and pedigree. Scheduled [employer] pickup for today, tracking number scanned in media for reference. Urgent referral faxed to [facility] Coordination Team. Email sent to team. [facility] will order test after pre-counseling completed.       Electronically signed by Jory Balbuena RN on 2025 at 9:15 AM

## 2025-01-31 NOTE — PROGRESS NOTES
Name: Mariana Lowery  : 1970  MRN: [id number]    Oncology Navigation Note      Contact Type: Integrated Breast Conference/Clinic    Providers present during Breast Conference: Roberto Yoo,Maribell,Mike,Josy    Providers present during Breast Clinic: Josy Mena, Roberto    Treatment Recommendations: Genetic counseling and testing, (TNBC and strong family history of breast and ovarian cancer), MRI breast (pt will contact PCP for sedation due to claustrophobia), lumpectomy with sentinel lymph node surgery, chemotherapy for TNBC, radiation therapy (15-20 treatment per Dr. Mejia).    General Interventions: Patient and  Akhil arrived to breast clinic. Plan discussed. Pt adamant against chemotherapy. \"I will not let you put that in my body.\" Pts wishes respected. Dr. Reyna did suggest that if SLN is positive, he would recommend axillary dissection if she declines chemotherapy. Mariana was in agreement for that. Individualized shared decision making utilized. Questions addressed by the team.     Education/Screenings: Breast Clinic Recommendation form completed, discussed, and copy given to patient. Refer to Recommendation form under media tab for further information.    Referrals: Oncology Rehab for baseline assessment, consult with Dr. Reyna following MRI and genetic results. Pt aware that we recommend an oncology referral to provide her information to make an informed decision regarding treatment. She would like to see Dr. Ferris when that time comes.    Notes: [facility] Verified label with patient's information per protocol. Packaged for shipping with demographics/insurance, pathology, and pedigree. Scheduled [employer] pickup for today, tracking number scanned in media for reference. Urgent referral faxed to [facility] Coordination Team, as well as an email.  [facility] will order test after pre-counseling completed.

## 2025-02-10 ENCOUNTER — HOSPITAL ENCOUNTER (OUTPATIENT)
Dept: MRI IMAGING | Age: 55
Discharge: HOME | End: 2025-02-10
Attending: SURGERY
Payer: COMMERCIAL

## 2025-02-10 DIAGNOSIS — Z17.1 MALIGNANT NEOPLASM OF UPPER-OUTER QUADRANT OF RIGHT BREAST IN FEMALE, ESTROGEN RECEPTOR NEGATIVE (HCC): ICD-10-CM

## 2025-02-10 DIAGNOSIS — C50.411 MALIGNANT NEOPLASM OF UPPER-OUTER QUADRANT OF RIGHT BREAST IN FEMALE, ESTROGEN RECEPTOR NEGATIVE (HCC): ICD-10-CM

## 2025-02-10 PROCEDURE — A9579 GAD-BASE MR CONTRAST NOS,1ML: HCPCS | Performed by: SURGERY

## 2025-02-10 PROCEDURE — C8908 MRI W/O FOL W/CONT, BREAST,: HCPCS

## 2025-02-10 PROCEDURE — 6360000004 HC RX CONTRAST MEDICATION: Performed by: SURGERY

## 2025-02-10 RX ADMIN — GADOTERIDOL 15 ML: 279.3 INJECTION, SOLUTION INTRAVENOUS at 10:12

## 2025-02-17 ENCOUNTER — OFFICE VISIT (OUTPATIENT)
Dept: SURGERY | Age: 55
End: 2025-02-17
Payer: COMMERCIAL

## 2025-02-17 VITALS
RESPIRATION RATE: 18 BRPM | HEIGHT: 62 IN | OXYGEN SATURATION: 97 % | WEIGHT: 156 LBS | BODY MASS INDEX: 28.71 KG/M2 | HEART RATE: 100 BPM | DIASTOLIC BLOOD PRESSURE: 82 MMHG | SYSTOLIC BLOOD PRESSURE: 128 MMHG | TEMPERATURE: 97.4 F

## 2025-02-17 DIAGNOSIS — Z17.1 CARCINOMA OF UPPER-OUTER QUADRANT OF RIGHT BREAST IN FEMALE, ESTROGEN RECEPTOR NEGATIVE (HCC): Primary | ICD-10-CM

## 2025-02-17 DIAGNOSIS — C50.411 CARCINOMA OF UPPER-OUTER QUADRANT OF RIGHT BREAST IN FEMALE, ESTROGEN RECEPTOR NEGATIVE (HCC): Primary | ICD-10-CM

## 2025-02-17 PROCEDURE — 99205 OFFICE O/P NEW HI 60 MIN: CPT | Performed by: SURGERY

## 2025-02-17 ASSESSMENT — ENCOUNTER SYMPTOMS
BLOOD IN STOOL: 0
EYE DISCHARGE: 0
PHOTOPHOBIA: 0
ANAL BLEEDING: 0
NAUSEA: 0
RHINORRHEA: 0
COLOR CHANGE: 0
FACIAL SWELLING: 0
SINUS PRESSURE: 0
RECTAL PAIN: 0
VOICE CHANGE: 0
STRIDOR: 0
DIARRHEA: 0
TROUBLE SWALLOWING: 0
CONSTIPATION: 0
ABDOMINAL PAIN: 0
WHEEZING: 0
ABDOMINAL DISTENTION: 0
CHOKING: 0
BACK PAIN: 0
CHEST TIGHTNESS: 0
EYE ITCHING: 0
SINUS PAIN: 0
APNEA: 0
COUGH: 0
VOMITING: 0
SORE THROAT: 0
EYE REDNESS: 0
SHORTNESS OF BREATH: 0
EYE PAIN: 0

## 2025-02-17 NOTE — PROGRESS NOTES
[facility]      Ezekiel Reyna MD MD  [facility]  General Surgery  New Patient Evaluation in Office  Pt Name: Mariana Lowery  Date of Birth 1970   Today's Date: 2/17/2025  Medical Record Number: [id number]  Referring Provider: No ref. provider found  Primary Care Provider: Marcell Savage MD  Chief Complaint   Patient presents with    Surgical Consult     NP refer [facility]-Right breast poorly differentiated invasive ductal carcinoma     ASSESSMENT      Problem List Items Addressed This Visit       Carcinoma of upper-outer quadrant of right breast in female, estrogen receptor negative (HCC) - Primary    Relevant Orders    NM LYMPHOSCINTIGRAM    US PLACE BREAST LOC DEVICE 1ST LESION RIGHT    MASTECTOMY PARTIAL / LUMPECTOMY    BIOPSY / EXCISION SENTINEL NODE DEEP AXILLARY      There are no active hospital problems to display for this patient.        Cancer Staging   Carcinoma of upper-outer quadrant of right breast in female, estrogen receptor negative (HCC)  Staging form: Breast, AJCC 8th Edition  - Clinical stage from 2/17/2025: Stage IB (cT1c, cN0(f), cM0, G3, ER-, DC-, HER2-) - Signed by Ezekiel Reyna MD on 2/17/2025     PLANS   Assessment & Plan   Schedule Mariana for nothing right at this moment  Her genetic testing is not back despite her being told they would be back before we saw her in the office.  In our discussions in the office if she has genetic predisposition for breast cancer she wants to proceed with bilateral mastectomy with reconstruction.  If she does not she wants to proceed with lumpectomy and sentinel lymph node biopsy multiple phone calls were made while the patient was in the office today but could not get any results so the patient after discussion of all the options did leave without making a decision awaiting results of genetic testing  We did have a discussion again regarding the fact this is triple negative and chemotherapy is recommended she says she has

## 2025-02-17 NOTE — PROGRESS NOTES
Subjective   Patient ID: Mariana Lowery is a 54 y.o. female.    HPI    Review of Systems   Constitutional:  Negative for activity change, appetite change, chills, diaphoresis, fatigue, fever and unexpected weight change.   HENT:  Negative for congestion, dental problem, drooling, ear discharge, ear pain, facial swelling, hearing loss, mouth sores, nosebleeds, postnasal drip, rhinorrhea, sinus pressure, sinus pain, sneezing, sore throat, tinnitus, trouble swallowing and voice change.    Eyes:  Negative for photophobia, pain, discharge, redness, itching and visual disturbance.   Respiratory:  Negative for apnea, cough, choking, chest tightness, shortness of breath, wheezing and stridor.    Cardiovascular:  Negative for chest pain, palpitations and leg swelling.   Gastrointestinal:  Negative for abdominal distention, abdominal pain, anal bleeding, blood in stool, constipation, diarrhea, nausea, rectal pain and vomiting.   Endocrine: Negative for cold intolerance, heat intolerance, polydipsia, polyphagia and polyuria.   Genitourinary:  Negative for decreased urine volume, difficulty urinating, dyspareunia, dysuria, enuresis, flank pain, frequency, genital sores, hematuria, menstrual problem, pelvic pain, urgency, vaginal bleeding, vaginal discharge and vaginal pain.   Musculoskeletal:  Negative for arthralgias, back pain, gait problem, joint swelling, myalgias, neck pain and neck stiffness.   Skin:  Negative for color change, pallor, rash and wound.   Allergic/Immunologic: Negative for environmental allergies, food allergies and immunocompromised state.   Neurological:  Negative for dizziness, tremors, seizures, syncope, facial asymmetry, speech difficulty, weakness, light-headedness, numbness and headaches.   Hematological:  Negative for adenopathy. Does not bruise/bleed easily.   Psychiatric/Behavioral:  Negative for agitation, behavioral problems, confusion, decreased concentration, dysphoric mood,

## 2025-02-19 ENCOUNTER — PREP FOR PROCEDURE (OUTPATIENT)
Dept: SURGERY | Age: 55
End: 2025-02-19

## 2025-02-19 ENCOUNTER — TELEPHONE (OUTPATIENT)
Dept: SURGERY | Age: 55
End: 2025-02-19

## 2025-02-19 DIAGNOSIS — C50.411 CARCINOMA OF UPPER-OUTER QUADRANT OF RIGHT BREAST IN FEMALE, ESTROGEN RECEPTOR NEGATIVE (HCC): Primary | ICD-10-CM

## 2025-02-19 DIAGNOSIS — Z17.1 CARCINOMA OF UPPER-OUTER QUADRANT OF RIGHT BREAST IN FEMALE, ESTROGEN RECEPTOR NEGATIVE (HCC): Primary | ICD-10-CM

## 2025-02-19 NOTE — TELEPHONE ENCOUNTER
Patient scheduled for surgery with Dr. Reyna on 02- at 12:30pm with an arrival of 8:30am at [facility].  10:15am for sentinel lymph node injection.  Patient advised to have nothing to eat or drink after midnight and to wash with antibacterial soap.  Order for Prehab placed.  Patient verbalized understanding.

## 2025-02-20 ENCOUNTER — HOSPITAL ENCOUNTER (OUTPATIENT)
Dept: PHYSICAL THERAPY | Age: 55
Setting detail: THERAPIES SERIES
Discharge: HOME | End: 2025-02-20
Payer: COMMERCIAL

## 2025-02-20 PROCEDURE — 97161 PT EVAL LOW COMPLEX 20 MIN: CPT

## 2025-02-20 NOTE — PROGRESS NOTES
per week for up to 12 weeks to address the treatment planned outlined above, with next appointment to be 3 weeks following surgery per protocol.    Time In 1000   Time Out 1035   Timed Code Minutes: 0 min   Total Treatment Time: 35 min       Electronically Signed by: Mikaela Medrano, PT

## 2025-02-21 ENCOUNTER — SOCIAL WORK (OUTPATIENT)
Dept: INFUSION THERAPY | Age: 55
End: 2025-02-21

## 2025-02-21 NOTE — PROGRESS NOTES
Oncology Social Work    Date: 2/21/2025  Time: 11:01 AM  Name: Mariana Lowery  MRN: [id number]     Contact Type: Distress Tool Follow-up    Note:   Situation: This staff called Mariana Lowery via phone support to introduce myself as the Oncology Social Worker.     Background: Mariana was referred to this staff by the General Surgery Dept after a recent appointment here. This staff was calling to review the Distress Thermometer provided during their visit.    Coping Score 0    Areas of Concern Identified    Physical Concern: None selected    Expressive Concern: None selected    Social Concern: None selected    Practical Concern: None selected    Existential Concern: None selected    Assessment: This staff had the opportunity to speak with Mariana. She seemed pleasant as we discussed the call's purpose was to educate about the services provided by the SW. She had no outstanding concerns and shared that she is coping well at this time.  - The opportunity to complete an Advance Directive was offered since it is not available in Medical Records. She will consider and let me know if she chooses to complete it in the future.  - No community referrals were placed now because she is too early to know or understand what services she may need. She will be having her lumpectomy surgery on 2/27 - but stressed she has a great support system between work and home. Consequently, her referral services may be reconsidered should her needs regarding assistance change.    Recommendation: Follow-up will be initiated based on need.  provided my contact information and will remain available for support.        SELVIN Guerrero, LUCIEN, PROSPER  Oncology Social Worker      Electronically signed by SELVIN Guerrero LSW, PROSPER on 2/21/2025 at 11:01 AM

## 2025-02-25 NOTE — H&P
H and P for Surgery           [facility]      Ezekiel Reyna MD MD  [facility]  General Surgery  New Patient Evaluation in Office  Pt Name: Mariana Lowery  Date of Birth 1970   Today's Date: 2/25/2025  Medical Record Number: [id number]  Referring Provider: No ref. provider found  Primary Care Provider: Marcell Savage MD  No chief complaint on file.    ASSESSMENT      Problem List Items Addressed This Visit    None       Active Hospital Problems    Diagnosis Date Noted    Malignant neoplasm of upper-outer quadrant of right female breast (HCC) [C50.411] 02/19/2025         Cancer Staging   Carcinoma of upper-outer quadrant of right breast in female, estrogen receptor negative (HCC)  Staging form: Breast, AJCC 8th Edition  - Clinical stage from 2/17/2025: Stage IB (cT1c, cN0(f), cM0, G3, ER-, NE-, HER2-) - Signed by Ezekiel Reyna MD on 2/17/2025     PLANS   Assessment & Plan   Schedule Mariana for nothing right at this moment  Her genetic testing is not back despite her being told they would be back before we saw her in the office.  In our discussions in the office if she has genetic predisposition for breast cancer she wants to proceed with bilateral mastectomy with reconstruction.  If she does not she wants to proceed with lumpectomy and sentinel lymph node biopsy multiple phone calls were made while the patient was in the office today but could not get any results so the patient after discussion of all the options did leave without making a decision awaiting results of genetic testing  We did have a discussion again regarding the fact this is triple negative and chemotherapy is recommended she says she has considered and is not willing to have chemotherapy either before or after surgery and is not interested in having a port.  She did agree to have radiation if she is able to have a lumpectomy.  If she wants to proceed with lumpectomy the following:  Ultrasound-guided needle localization  enlarged and no tenderness, skin normal.   BREAST: .  Right breast is examined by palpation upper outer quadrant I can feel ill-defined firm mass at the expected location of the biopsy-proven lesion in the breast.  It does measure about 15 mm.  It is not fixed any structure.  No axillary adenopathy no significant hematoma on the right.  Left breast examined no skin changes no skin dimpling no ax adenopathy no suspicious masses  CHEST/LUNGS: chest symmetric with normal A/P diameter, normal respiratory rate and rhythm, lungs clear to auscultation without wheezes, rales or rhonchi. No accessory muscle use. Scars None   CARDIOVASCULAR: Heart sounds are normal.  Regular rate and rhythm without murmur, gallop or rub. Normal S1 and S2.. Carotid and femoral pulses 2+/4 and equal bilaterally.  ABDOMEN: Normal shape. Laparoscopic scar(s) present. Normal bowel sounds.  No bruits. . \"soft, nontender, nondistended, no masses or organomegaly. no evidence of hernia. Percussion: Normal without hepatosplenomegally. Tenderness: absent.  RECTAL: deferred, not clinically indicated  NEUROLOGIC: There are no focalizing motor or sensory deficits. CN II-XII are grossly intact..    EXTREMITIES: no cyanosis, no clubbing, and no edema.               Electronically signed by Ezekiel Reyna MD on 2/25/2025 at 7:12 AM    The patients records and films were reviewed independently.  Time was given for questions . All questions were answered to the patients satisfaction. A total time of 60 minutes  was spent with at least 51% related to counseling and coordination of care.

## 2025-02-27 ENCOUNTER — HOSPITAL ENCOUNTER (OUTPATIENT)
Age: 55
Setting detail: OUTPATIENT SURGERY
Discharge: HOME | End: 2025-02-27
Attending: SURGERY | Admitting: SURGERY
Payer: COMMERCIAL

## 2025-02-27 ENCOUNTER — HOSPITAL ENCOUNTER (OUTPATIENT)
Dept: NUCLEAR MEDICINE | Age: 55
Discharge: HOME | End: 2025-02-27
Attending: SURGERY
Payer: COMMERCIAL

## 2025-02-27 ENCOUNTER — ANESTHESIA EVENT (OUTPATIENT)
Dept: OPERATING ROOM | Age: 55
End: 2025-02-27
Payer: COMMERCIAL

## 2025-02-27 ENCOUNTER — ANESTHESIA (OUTPATIENT)
Dept: OPERATING ROOM | Age: 55
End: 2025-02-27
Payer: COMMERCIAL

## 2025-02-27 ENCOUNTER — HOSPITAL ENCOUNTER (OUTPATIENT)
Dept: WOMENS IMAGING | Age: 55
Discharge: HOME | End: 2025-02-27
Payer: COMMERCIAL

## 2025-02-27 ENCOUNTER — HOSPITAL ENCOUNTER (OUTPATIENT)
Dept: WOMENS IMAGING | Age: 55
Discharge: HOME | End: 2025-02-27
Attending: SURGERY
Payer: COMMERCIAL

## 2025-02-27 VITALS
TEMPERATURE: 98 F | SYSTOLIC BLOOD PRESSURE: 125 MMHG | WEIGHT: 156 LBS | OXYGEN SATURATION: 98 % | HEIGHT: 63 IN | HEART RATE: 65 BPM | DIASTOLIC BLOOD PRESSURE: 85 MMHG | BODY MASS INDEX: 27.64 KG/M2 | RESPIRATION RATE: 16 BRPM

## 2025-02-27 DIAGNOSIS — C50.411 CARCINOMA OF UPPER-OUTER QUADRANT OF RIGHT BREAST IN FEMALE, ESTROGEN RECEPTOR NEGATIVE (HCC): ICD-10-CM

## 2025-02-27 DIAGNOSIS — Z17.1 CARCINOMA OF UPPER-OUTER QUADRANT OF RIGHT BREAST IN FEMALE, ESTROGEN RECEPTOR NEGATIVE (HCC): ICD-10-CM

## 2025-02-27 DIAGNOSIS — C50.411 CARCINOMA OF UPPER-OUTER QUADRANT OF FEMALE BREAST, RIGHT (HCC): ICD-10-CM

## 2025-02-27 DIAGNOSIS — C50.411 MALIGNANT NEOPLASM OF UPPER-OUTER QUADRANT OF RIGHT FEMALE BREAST (HCC): ICD-10-CM

## 2025-02-27 DIAGNOSIS — Z98.890 STATUS POST RIGHT BREAST LUMPECTOMY: Primary | ICD-10-CM

## 2025-02-27 PROCEDURE — 2709999900 HC NON-CHARGEABLE SUPPLY: Performed by: SURGERY

## 2025-02-27 PROCEDURE — 88305 TISSUE EXAM BY PATHOLOGIST: CPT

## 2025-02-27 PROCEDURE — 6360000002 HC RX W HCPCS: Performed by: NURSE ANESTHETIST, CERTIFIED REGISTERED

## 2025-02-27 PROCEDURE — 6360000002 HC RX W HCPCS: Performed by: SURGERY

## 2025-02-27 PROCEDURE — 6370000000 HC RX 637 (ALT 250 FOR IP): Performed by: SURGERY

## 2025-02-27 PROCEDURE — 2580000003 HC RX 258: Performed by: SURGERY

## 2025-02-27 PROCEDURE — G0279 TOMOSYNTHESIS, MAMMO: HCPCS

## 2025-02-27 PROCEDURE — 3600000003 HC SURGERY LEVEL 3 BASE: Performed by: SURGERY

## 2025-02-27 PROCEDURE — 3700000000 HC ANESTHESIA ATTENDED CARE: Performed by: SURGERY

## 2025-02-27 PROCEDURE — 2709999900 US PLACE BREAST LOC DEVICE 1ST LESION RIGHT

## 2025-02-27 PROCEDURE — 2500000003 HC RX 250 WO HCPCS: Performed by: SURGERY

## 2025-02-27 PROCEDURE — 88341 IMHCHEM/IMCYTCHM EA ADD ANTB: CPT

## 2025-02-27 PROCEDURE — 78195 LYMPH SYSTEM IMAGING: CPT

## 2025-02-27 PROCEDURE — 7100000011 HC PHASE II RECOVERY - ADDTL 15 MIN: Performed by: SURGERY

## 2025-02-27 PROCEDURE — 7100000010 HC PHASE II RECOVERY - FIRST 15 MIN: Performed by: SURGERY

## 2025-02-27 PROCEDURE — 3430000000 HC RX DIAGNOSTIC RADIOPHARMACEUTICAL: Performed by: SURGERY

## 2025-02-27 PROCEDURE — 76098 X-RAY EXAM SURGICAL SPECIMEN: CPT

## 2025-02-27 PROCEDURE — 3700000001 HC ADD 15 MINUTES (ANESTHESIA): Performed by: SURGERY

## 2025-02-27 PROCEDURE — 88307 TISSUE EXAM BY PATHOLOGIST: CPT

## 2025-02-27 PROCEDURE — A9541 TC99M SULFUR COLLOID: HCPCS | Performed by: SURGERY

## 2025-02-27 PROCEDURE — 3600000013 HC SURGERY LEVEL 3 ADDTL 15MIN: Performed by: SURGERY

## 2025-02-27 PROCEDURE — 88342 IMHCHEM/IMCYTCHM 1ST ANTB: CPT

## 2025-02-27 RX ORDER — SODIUM CHLORIDE 0.9 % (FLUSH) 0.9 %
5-40 SYRINGE (ML) INJECTION EVERY 12 HOURS SCHEDULED
Status: DISCONTINUED | OUTPATIENT
Start: 2025-02-27 | End: 2025-02-27 | Stop reason: HOSPADM

## 2025-02-27 RX ORDER — SODIUM CHLORIDE 0.9 % (FLUSH) 0.9 %
5-40 SYRINGE (ML) INJECTION PRN
Status: CANCELLED | OUTPATIENT
Start: 2025-02-27

## 2025-02-27 RX ORDER — SODIUM CHLORIDE 9 MG/ML
INJECTION, SOLUTION INTRAVENOUS PRN
Status: DISCONTINUED | OUTPATIENT
Start: 2025-02-27 | End: 2025-02-27 | Stop reason: HOSPADM

## 2025-02-27 RX ORDER — ONDANSETRON 2 MG/ML
4 INJECTION INTRAMUSCULAR; INTRAVENOUS
Status: CANCELLED | OUTPATIENT
Start: 2025-02-27 | End: 2025-02-28

## 2025-02-27 RX ORDER — IBUPROFEN 800 MG/1
800 TABLET, FILM COATED ORAL ONCE
Status: COMPLETED | OUTPATIENT
Start: 2025-02-27 | End: 2025-02-27

## 2025-02-27 RX ORDER — PROPOFOL 10 MG/ML
INJECTION, EMULSION INTRAVENOUS
Status: DISCONTINUED | OUTPATIENT
Start: 2025-02-27 | End: 2025-02-27 | Stop reason: SDUPTHER

## 2025-02-27 RX ORDER — MEPERIDINE HYDROCHLORIDE 25 MG/ML
12.5 INJECTION INTRAMUSCULAR; INTRAVENOUS; SUBCUTANEOUS EVERY 5 MIN PRN
Status: CANCELLED | OUTPATIENT
Start: 2025-02-27

## 2025-02-27 RX ORDER — SODIUM CHLORIDE 0.9 % (FLUSH) 0.9 %
5-40 SYRINGE (ML) INJECTION EVERY 12 HOURS SCHEDULED
Status: CANCELLED | OUTPATIENT
Start: 2025-02-27

## 2025-02-27 RX ORDER — ONDANSETRON 2 MG/ML
INJECTION INTRAMUSCULAR; INTRAVENOUS
Status: DISCONTINUED | OUTPATIENT
Start: 2025-02-27 | End: 2025-02-27 | Stop reason: SDUPTHER

## 2025-02-27 RX ORDER — SODIUM CHLORIDE 0.9 % (FLUSH) 0.9 %
5-40 SYRINGE (ML) INJECTION PRN
Status: DISCONTINUED | OUTPATIENT
Start: 2025-02-27 | End: 2025-02-27 | Stop reason: HOSPADM

## 2025-02-27 RX ORDER — NALOXONE HYDROCHLORIDE 0.4 MG/ML
INJECTION, SOLUTION INTRAMUSCULAR; INTRAVENOUS; SUBCUTANEOUS PRN
Status: CANCELLED | OUTPATIENT
Start: 2025-02-27

## 2025-02-27 RX ORDER — FENTANYL CITRATE 50 UG/ML
INJECTION, SOLUTION INTRAMUSCULAR; INTRAVENOUS
Status: DISCONTINUED | OUTPATIENT
Start: 2025-02-27 | End: 2025-02-27 | Stop reason: SDUPTHER

## 2025-02-27 RX ORDER — ACETAMINOPHEN 500 MG
1000 TABLET ORAL ONCE
Status: COMPLETED | OUTPATIENT
Start: 2025-02-27 | End: 2025-02-27

## 2025-02-27 RX ORDER — MIDAZOLAM HYDROCHLORIDE 1 MG/ML
INJECTION, SOLUTION INTRAMUSCULAR; INTRAVENOUS
Status: DISCONTINUED | OUTPATIENT
Start: 2025-02-27 | End: 2025-02-27 | Stop reason: SDUPTHER

## 2025-02-27 RX ORDER — FENTANYL CITRATE 50 UG/ML
50 INJECTION, SOLUTION INTRAMUSCULAR; INTRAVENOUS EVERY 5 MIN PRN
Status: CANCELLED | OUTPATIENT
Start: 2025-02-27

## 2025-02-27 RX ORDER — SODIUM CHLORIDE 9 MG/ML
INJECTION, SOLUTION INTRAVENOUS PRN
Status: CANCELLED | OUTPATIENT
Start: 2025-02-27

## 2025-02-27 RX ORDER — HYDROCODONE BITARTRATE AND ACETAMINOPHEN 5; 325 MG/1; MG/1
1 TABLET ORAL EVERY 6 HOURS PRN
Qty: 20 TABLET | Refills: 0 | Status: SHIPPED | OUTPATIENT
Start: 2025-02-27 | End: 2025-03-04

## 2025-02-27 RX ORDER — SODIUM CHLORIDE 9 MG/ML
INJECTION, SOLUTION INTRAVENOUS CONTINUOUS
Status: DISCONTINUED | OUTPATIENT
Start: 2025-02-27 | End: 2025-02-27 | Stop reason: HOSPADM

## 2025-02-27 RX ADMIN — PROPOFOL 20 MG: 10 INJECTION, EMULSION INTRAVENOUS at 11:45

## 2025-02-27 RX ADMIN — MIDAZOLAM 2 MG: 1 INJECTION INTRAMUSCULAR; INTRAVENOUS at 11:38

## 2025-02-27 RX ADMIN — PROPOFOL 20 MG: 10 INJECTION, EMULSION INTRAVENOUS at 11:55

## 2025-02-27 RX ADMIN — PROPOFOL 20 MG: 10 INJECTION, EMULSION INTRAVENOUS at 12:12

## 2025-02-27 RX ADMIN — ACETAMINOPHEN 1000 MG: 500 TABLET ORAL at 11:03

## 2025-02-27 RX ADMIN — ONDANSETRON 4 MG: 2 INJECTION, SOLUTION INTRAMUSCULAR; INTRAVENOUS at 12:10

## 2025-02-27 RX ADMIN — FENTANYL CITRATE 100 MCG: 50 INJECTION, SOLUTION INTRAMUSCULAR; INTRAVENOUS at 11:38

## 2025-02-27 RX ADMIN — PROPOFOL 20 MG: 10 INJECTION, EMULSION INTRAVENOUS at 11:59

## 2025-02-27 RX ADMIN — PROPOFOL 40 MG: 10 INJECTION, EMULSION INTRAVENOUS at 11:39

## 2025-02-27 RX ADMIN — PROPOFOL 20 MG: 10 INJECTION, EMULSION INTRAVENOUS at 12:21

## 2025-02-27 RX ADMIN — IBUPROFEN 800 MG: 800 TABLET, FILM COATED ORAL at 11:03

## 2025-02-27 RX ADMIN — PROPOFOL 20 MG: 10 INJECTION, EMULSION INTRAVENOUS at 12:17

## 2025-02-27 RX ADMIN — PROPOFOL 20 MG: 10 INJECTION, EMULSION INTRAVENOUS at 11:50

## 2025-02-27 RX ADMIN — WATER 2000 MG: 1 INJECTION INTRAMUSCULAR; INTRAVENOUS; SUBCUTANEOUS at 11:43

## 2025-02-27 RX ADMIN — Medication 1 MILLICURIE: at 10:10

## 2025-02-27 RX ADMIN — PROPOFOL 20 MG: 10 INJECTION, EMULSION INTRAVENOUS at 12:06

## 2025-02-27 RX ADMIN — SODIUM CHLORIDE: 0.9 INJECTION, SOLUTION INTRAVENOUS at 11:02

## 2025-02-27 ASSESSMENT — PAIN SCALES - GENERAL: PAINLEVEL_OUTOF10: 0

## 2025-02-27 NOTE — H&P
H and P for Surgery           [facility]  History and Physical Update    Pt Name: Mariana Lowery  MRN: [id number]  YOB: 1970  Date of evaluation: 2/27/2025    [x] I have examined the patient and reviewed the H&P/Consult and there are no changes to the patient or plans.    [] I have examined the patient and reviewed the H&P/Consult and have noted the following changes:        Ezekiel Reyna MD  Electronically signed 2/27/2025 at 10:33 AM

## 2025-02-27 NOTE — OP NOTE
[facility]  Operative Report    PATIENT NAME: Mariana Lowery  MEDICAL RECORD NO. [id number]  SURGEON: Ezekiel Reyna MD MD FACS  Primary Care Physician: Marcell Savage MD  Date: 2/27/2025, 10:33 AM     PROCEDURE PERFORMED: Right  [place] Lymph node Injection  PREOPERATIVE DIAGNOSIS: Right Breast Cancer  POSTOPERATIVE DIAGNOSIS: Same, path pending  SURGEON:  Ezekiel Reyna MD MD FACS  ANESTHESIA:  None  ESTIMATED BLOOD LOSS:  None  COMPLICATIONS:  None; patient tolerated the procedure well.  CONDITION: stable          Procedure Details     The patient was seen in Nuclear Medicine in radiology. The risks, benefits, complications, treatment options, and expected outcomes were previously discussed with the patient. The possibilities of reaction to medication were discussed with the patient. The patient concurred with the proposed plan, giving informed consent.  The site of surgery properly noted/marked.   The patient was taken to the nuclear medicine room and identified  and the procedure verified as [place] Lymphnode injection . A Time Out was held and the above information confirmed.  The right breast was identified and the site for injection periareaolarly was identified. The breast was prepped with 1% alcohol. 0.5 cc 's of unfiltered technetium sulfur colloid was injected into the dermis of the skin at the site. A sterile dressing was placed over the injection site. The patient tolerated the procedure well.             Ezekiel eRyna MD, MD FACS  Electronically signed 2/27/2025 at 10:33 AM

## 2025-02-27 NOTE — PROGRESS NOTES
Contact Type:  [facility]    Contact Information: Arrived with  for needle localization. Having breast surgery today with Dr. Reyna.    Diagnosis: Invasive Ductal Carcioma    Patient Expresses Need(s) For: n/a     Teaching Sheets/Booklets Provided: n/a    Notes: Procedure explained and questions addressed as needed.  Dr. Norris placed wire. Secured with gauze and tape per protocol. ClairissaTransported patient with belongings to Nuclear Medicine Department via wheelchair.

## 2025-02-27 NOTE — DISCHARGE INSTRUCTIONS
[facility]      DR. SIMMONS'S DISCHARGE INSTRUCTIONS    Pt Name: Mariana Lowery  Medical Record Number: [id number]  Today's Date: 2/27/2025           GENERAL ANESTHESIA OR SEDATION:    [x]  Do not drive or operate hazardous machinery for 24 hours.  [x] Do not make important business or personal decisions for 24 hours.  [x] Do not drink alcoholic beverages or use tobacco for 24 hours.           ACTIVITY INSTRUCTIONS:    [] Rest today. Resume light to normal activity tomorrow.   [x] You may resume normal activity tomorrow. Do not engage in strenuous activity that may place stress on your incision.  [x] Do not drive  UNTIL NO LONGER TAKING NARCOTICS AND DAILY ACTIVITIES  ARE NEARLY NORMAL     [x]Avoid heavy lifting, tugging, pullings greater  than  25 lbs until seen in the office.               DIET INSTRUCTIONS:    []Begin with clear liquids. If not nauseated, may increase to a low-fat diet when you desire. Greasy and spicy foods are not advised.  [x]Regular diet as tolerated.  []Other:          MEDICATIONS  [x]Prescription sent with you to be used as directed.   []Lortab   [x]Norco   []Percocet   []Tylenol #3   []NONE              []Antibiotic              []Tramadol       Do not drink alcohol or drive while taking these medications.   You may experience dizziness or drowsiness with these medications.   You may also experience constipation which can be relieved with stool softners or laxatives.    [x]You may resume your daily prescription medication schedule unless otherwise specified.  []Do not take 325mg Aspirin or other blood thinners such as Coumadin or Plavix for 5 days.            WOUND/DRESSING INSTRUCTIONS:    Always ensure you and your care giver clean hands before and after caring for the wound.    [] Keep dressing clean and dry for 24 hours.       [] Allow steri-strips to fall off on their own.   [] Ice operative site for 20 minutes 4 times a day.     [x] May wash over incision in  blood in the urine.  Pain and/or swelling in your feet, calves, or legs.  Dark urine, light stools, or evidence of jaundice (yellowing of the skin or eyes).    In case of an emergency, CALL 9-1-1 immediately       FOLLOW-UP APPOINTMENT   [x]1 week   []2 weeks   []NONE    Call my office if you have any problem that concerns you [phone]. After hours, you can reach the answering service via the office phone number. IF YOU NEED IMMEDIATE ATTENTION, GO TO THE EMERGENCY ROOM AND YOUR DOCTOR WILL BE CONTACTED.        Ezekiel Reyna MD M.D. [address]  [place] [address]  Electronically signed 2/27/2025 at 12:34 PM

## 2025-02-27 NOTE — ANESTHESIA PRE PROCEDURE
Department of Anesthesiology  Preprocedure Note       Name:  Mariana Lowery   Age:  54 y.o.  :  1970                                          MRN:  [id number]         Date:  2025      Surgeon: Surgeon(s):  Ezekiel Reyna MD    Procedure: Procedure(s):  Right breast lumpectomy with preop needle localization with sentinel lymph node biopsy    Medications prior to admission:   Prior to Admission medications    Medication Sig Start Date End Date Taking? Authorizing Provider   atorvastatin (LIPITOR) 40 MG tablet Take 1 tablet by mouth daily 24  Yes Marcell Savage MD   ibuprofen (ADVIL;MOTRIN) 600 MG tablet Take 1 tablet by mouth every 6 hours as needed for Pain 23  Yes Katia Smith MD       Current medications:    Current Facility-Administered Medications   Medication Dose Route Frequency Provider Last Rate Last Admin   • 0.9 % sodium chloride infusion   IntraVENous Continuous Ezekiel Reyna  mL/hr at 25 1102 New Bag at 25 1102   • sodium chloride flush 0.9 % injection 5-40 mL  5-40 mL IntraVENous 2 times per day Ezekiel Reyna MD       • sodium chloride flush 0.9 % injection 5-40 mL  5-40 mL IntraVENous PRN Ezekiel Reyna MD       • 0.9 % sodium chloride infusion   IntraVENous PRN Ezekiel Reyna MD       • ceFAZolin (ANCEF) 2,000 mg in sterile water 20 mL IV syringe  2,000 mg IntraVENous On Call to OR Ezekiel Reyna MD           Allergies:  No Known Allergies    Problem List:    Patient Active Problem List   Diagnosis Code   • Carcinoma of upper-outer quadrant of right breast in female, estrogen receptor negative (HCC) C50.411, Z17.1       Past Medical History:        Diagnosis Date   • Cancer (HCC)     mole removed non cancerous per pt   • Hyperlipidemia    • Left carotid bruit    • Xanthelasma        Past Surgical History:        Procedure Laterality Date   • COLONOSCOPY  06/15/2021    Dr. Kimi Callahan   • DENTAL SURGERY

## 2025-02-27 NOTE — ANESTHESIA POSTPROCEDURE EVALUATION
Department of Anesthesiology  Postprocedure Note    Patient: Mariana Lowery  MRN: [id number]  YOB: 1970  Date of evaluation: 2/27/2025    Procedure Summary       Date: 02/27/25 Room / Location: [facility] OR  / [facility] OR    Anesthesia Start: 1136 Anesthesia Stop: 1230    Procedure: Right breast lumpectomy with preop needle localization with biopsy of sentinel lymph node (Right: Breast) Diagnosis:       Malignant neoplasm of upper-outer quadrant of right female breast (HCC)      (Malignant neoplasm of upper-outer quadrant of right female breast (HCC) [C50.411])    Surgeons: Ezekiel Reyna MD Responsible Provider: Amrit العراقي DO    Anesthesia Type: general ASA Status: 3            Anesthesia Type: No value filed.    Shilpa Phase I: Shilpa Score: 10    Shilpa Phase II: Shilpa Score: 10    Anesthesia Post Evaluation    Comments: [facility]  POST-ANESTHESIA NOTE       Name:  Mariana Lowery                                         Age:  54 y.o.  MRN:  [id number]      Last Vitals:  /85   Pulse 65   Temp 98 °F (36.7 °C) (Temporal)   Resp 16   Ht 1.6 m (5' 3\")   Wt 70.8 kg (156 lb)   LMP 04/10/2023   SpO2 98%   BMI 27.63 kg/m²   Patient Vitals in the past 4 hrs:  02/27/25 1315, BP:125/85, Pulse:65, Resp:16, SpO2:98 %  02/27/25 1254, BP:128/70, Pulse:65, Resp:16, SpO2:97 %  02/27/25 1238, BP:131/80, Temp:98 °F (36.7 °C), Temp src:Temporal, Pulse:75, Resp:16, SpO2:97 %    Level of Consciousness:  Awake    Respiratory:  Stable    Oxygen Saturation:  Stable    Cardiovascular:  Stable    Hydration:  Adequate    PONV:  Stable    Post-op Pain:  Adequate analgesia    Post-op Assessment:  No apparent anesthetic complications    Additional Follow-Up / Treatment / Comment:  None    Javier Castillo DO  February 27, 2025   3:09 PM      No notable events documented.  
General Sunscreen Counseling: Broad spectrum sunscreen (SPF 50 or greater) should be applied especially during peak UV exposure (between 10am and 2pm). Reapply every 1-2 hours, especially after exercise and swimming.
Detail Level: Simple

## 2025-02-27 NOTE — PROGRESS NOTES

## 2025-02-27 NOTE — OP NOTE
[facility]  Operative Report    PATIENT NAME: Mariana Lowery  MEDICAL RECORD NO. [id number]  SURGEON: Ezekiel Reyna MD MD FACS  Primary Care Physician: Marcell Savage MD  Date: 2/27/2025, 12:26 PM     PROCEDURE PERFORMED: Right  Lumpectomy and [place] Lymphnode biopsy  PREOPERATIVE DIAGNOSIS: Right  infiltrating ductal carcinoma breast cancer  POSTOPERATIVE DIAGNOSIS: Same, path pending  SURGEON:  Ezekiel Reyna MD MD FACS  ANESTHESIA:  Monitored Local Anesthesia with Sedation  ESTIMATED BLOOD LOSS:  40  ml  SPECIMEN: Oriented lumpectomy specimen and sentinel lymph node biopsy x 1 and nonsentinel lymph node as it did have a minor amount of radioactive tracer uptake but it was not 10% of the sentinel nodes number  COMPLICATIONS:  None; patient tolerated the procedure well.  DRAINS: none  DISPOSITION: Outpatient Surgery  CONDITION: stable      Indications: This patient presents with history of Right breast cancer with clinically negative axillary lymph node exam.      Procedure Details   The patient was seen in the Holding Area. The risks, benefits, complications, treatment options, and expected outcomes had been previously discussed with the patient. The possibilities of reaction to medication, pulmonary aspiration, bleeding, infection, the need for additional procedures, failure to diagnose a condition, and creating a complication requiring transfusion or operation were discussed with the patient. The patient concurred with the proposed plan, giving informed consent.  The site of surgery properly noted/marked.   The patient was brought to the operating room, placed supine on the operating room table, time, was taken, preoperative antibiotics were given, and signed consent on the chart. Sequential compression devices were in place. Previous needle localization had been performed, as well as sentinel injection. The right axilla was mapped using a hand-held gamma probe, axillary  second and is tributaries ligated between clips and brought up it did still measure 886 and it was sent as sentinel lymph node #1 so that the other 1 we found first does not even 10% it was sent as a nonsentinel node.  It the lumpectomy site was then reinspected for hemostasis. Hemostasis was accomplished with electrocautery. Clips were spaced for marking of the cavity for radiation planning. Once hemostasis was satisfactory, the wound was closed with 3-0 Vicryl for deep tissue, and 4-0 Vicryl running, subcuticular stitch for the skin.  Skin affix glue was applied, and the patient brought back to outpatient in stable condition. At the end of the procedure, sponge and needle counts correct. No apparent complications were noted.      Findings:  grossly clear surgical margins        Specimens: [place] lymph nodes(s) 1 level 2 node, a nonsentinel node , and oriented lumpectomy specimen                   Complications:  None; patient tolerated the procedure well.           Disposition: sds           Condition: stable    [place] Node Biopsy for Breast Cancer - Right  Operation performed with curative intent. Yes   Tracer(s) used to identify sentinel nodes in the upfront surgery (non-neoadjuvant) setting (select all that apply). Radioactive tracer   Tracer(s) used to identify sentinel nodes in the neoadjuvant setting (select all that apply). N/A   All nodes (colored or non-colored) present at the end of a dye-filled lymphatic channel were removed. N/A   All significantly radioactive nodes were removed. Yes   All palpably suspicious nodes were removed. Yes   Biopsy-proven positive nodes marked with clips prior to chemotherapy were identified and removed. N/A           Ezekiel Reyna MD, MD FACS  Electronically signed 2/27/2025 at 12:26 PM

## 2025-02-27 NOTE — PROGRESS NOTES
Pt returned to [facility] room 19. Vitals and assessment as charted. 0.9 infusing,  to count from PACU. Pt has crackers and water. Family at the bedside. Pt and family verbalized understanding of discharge criteria and call light use. Call light in reach.

## 2025-02-27 NOTE — PROGRESS NOTES
Patient oriented to Same Day department and admitted to Same Day Surgery room 19.   Patient verbalized approval for first name, last initial with physician name on unit whiteboard.     Plan of care reviewed with patient.   Patient room whiteboard filled out and discussed with patient and responsible adult.       Call light in reach.   Bed in lowest position, locked, with one bed rail up.   SCDs and warming blanket in place.  Appropriate arm bands on patient.   Bathroom offered.   All questions and concerns of patient addressed.        Meds to Beds:   Patient informed of [facility] Meds to Beds program during admission. Patient has declined use of program.

## 2025-02-28 ENCOUNTER — TELEPHONE (OUTPATIENT)
Dept: SURGERY | Age: 55
End: 2025-02-28

## 2025-02-28 NOTE — TELEPHONE ENCOUNTER
Post procedural phone call placed to patient. Patient utilizing prescribed medication with adequate control of pain. Time spent for patient questions. Patient to follow up with office as needed.

## 2025-03-06 ENCOUNTER — TELEPHONE (OUTPATIENT)
Dept: FAMILY MEDICINE CLINIC | Age: 55
End: 2025-03-06

## 2025-03-06 ENCOUNTER — OFFICE VISIT (OUTPATIENT)
Dept: FAMILY MEDICINE CLINIC | Age: 55
End: 2025-03-06

## 2025-03-06 VITALS
RESPIRATION RATE: 16 BRPM | BODY MASS INDEX: 27.96 KG/M2 | SYSTOLIC BLOOD PRESSURE: 132 MMHG | HEIGHT: 63 IN | WEIGHT: 157.8 LBS | HEART RATE: 80 BPM | DIASTOLIC BLOOD PRESSURE: 86 MMHG

## 2025-03-06 DIAGNOSIS — E78.00 HYPERCHOLESTEROLEMIA: ICD-10-CM

## 2025-03-06 DIAGNOSIS — C50.411 CARCINOMA OF UPPER-OUTER QUADRANT OF RIGHT BREAST IN FEMALE, ESTROGEN RECEPTOR NEGATIVE (HCC): ICD-10-CM

## 2025-03-06 DIAGNOSIS — Z17.1 CARCINOMA OF UPPER-OUTER QUADRANT OF RIGHT BREAST IN FEMALE, ESTROGEN RECEPTOR NEGATIVE (HCC): ICD-10-CM

## 2025-03-06 DIAGNOSIS — R25.2 CRAMP IN MUSCLE: Primary | ICD-10-CM

## 2025-03-06 SDOH — ECONOMIC STABILITY: FOOD INSECURITY: WITHIN THE PAST 12 MONTHS, THE FOOD YOU BOUGHT JUST DIDN'T LAST AND YOU DIDN'T HAVE MONEY TO GET MORE.: SOMETIMES TRUE

## 2025-03-06 SDOH — ECONOMIC STABILITY: FOOD INSECURITY: WITHIN THE PAST 12 MONTHS, YOU WORRIED THAT YOUR FOOD WOULD RUN OUT BEFORE YOU GOT MONEY TO BUY MORE.: SOMETIMES TRUE

## 2025-03-06 SDOH — ECONOMIC STABILITY: INCOME INSECURITY: IN THE LAST 12 MONTHS, WAS THERE A TIME WHEN YOU WERE NOT ABLE TO PAY THE MORTGAGE OR RENT ON TIME?: YES

## 2025-03-06 SDOH — ECONOMIC STABILITY: TRANSPORTATION INSECURITY
IN THE PAST 12 MONTHS, HAS LACK OF TRANSPORTATION KEPT YOU FROM MEETINGS, WORK, OR FROM GETTING THINGS NEEDED FOR DAILY LIVING?: NO

## 2025-03-06 SDOH — ECONOMIC STABILITY: TRANSPORTATION INSECURITY
IN THE PAST 12 MONTHS, HAS THE LACK OF TRANSPORTATION KEPT YOU FROM MEDICAL APPOINTMENTS OR FROM GETTING MEDICATIONS?: NO

## 2025-03-06 ASSESSMENT — PATIENT HEALTH QUESTIONNAIRE - PHQ9
SUM OF ALL RESPONSES TO PHQ QUESTIONS 1-9: 0
SUM OF ALL RESPONSES TO PHQ QUESTIONS 1-9: 0
2. FEELING DOWN, DEPRESSED OR HOPELESS: NOT AT ALL
SUM OF ALL RESPONSES TO PHQ QUESTIONS 1-9: 0
1. LITTLE INTEREST OR PLEASURE IN DOING THINGS: NOT AT ALL
SUM OF ALL RESPONSES TO PHQ9 QUESTIONS 1 & 2: 0
SUM OF ALL RESPONSES TO PHQ QUESTIONS 1-9: 0
2. FEELING DOWN, DEPRESSED OR HOPELESS: NOT AT ALL
1. LITTLE INTEREST OR PLEASURE IN DOING THINGS: NOT AT ALL

## 2025-03-06 NOTE — PROGRESS NOTES
Date: 3/6/2025    :    Mariana Lowery is a 54 y.o.female who presents today for:  Chief Complaint   Patient presents with    Leg Cramps         HPI:     HPI    Cramps x 3 days all day, No back , no parestesia. Tried hot bath    CurrentHome Medications were reviewed and updated today by this Provider  Current Outpatient Medications   Medication Sig Dispense Refill    atorvastatin (LIPITOR) 40 MG tablet Take 1 tablet by mouth daily 90 tablet 1    ibuprofen (ADVIL;MOTRIN) 600 MG tablet Take 1 tablet by mouth every 6 hours as needed for Pain 30 tablet 1     No current facility-administered medications for this visit.       Previous Notes, Consultations Notes, previous laboratories available were reviewed with the patient during this visit:    Allergies, Problem List, Medications, Medical History, Family History, Surgical History and Tobacco History reviewed and updated by provider.       Subjective:      Review of Systems   Constitutional:  Negative for appetite change, chills, diaphoresis, fatigue and fever.   HENT:  Positive for congestion. Negative for ear pain, postnasal drip, rhinorrhea, sinus pressure, sneezing, sore throat, trouble swallowing and voice change.    Respiratory:  Positive for cough. Negative for chest tightness, shortness of breath and wheezing.    Cardiovascular:  Negative for chest pain, palpitations and leg swelling.   Gastrointestinal:  Negative for abdominal pain, constipation, diarrhea, nausea and vomiting.   Musculoskeletal:  Negative for arthralgias, back pain, joint swelling, myalgias, neck pain and neck stiffness.   Neurological:  Negative for dizziness, syncope, weakness, light-headedness, numbness and headaches.       Objective:     /86 (Site: Left Upper Arm, Position: Sitting, Cuff Size: Medium Adult)   Pulse 80   Resp 16   Ht 1.6 m (5' 3\")   Wt 71.6 kg (157 lb 12.8 oz)   LMP 04/10/2023   BMI 27.95 kg/m²   BP Readings from Last 3 Encounters:   03/06/25 132/86   02/27/25

## 2025-03-10 ENCOUNTER — OFFICE VISIT (OUTPATIENT)
Dept: SURGERY | Age: 55
End: 2025-03-10

## 2025-03-10 VITALS
HEART RATE: 88 BPM | WEIGHT: 157 LBS | OXYGEN SATURATION: 95 % | BODY MASS INDEX: 27.82 KG/M2 | HEIGHT: 63 IN | TEMPERATURE: 98.2 F | DIASTOLIC BLOOD PRESSURE: 68 MMHG | SYSTOLIC BLOOD PRESSURE: 104 MMHG

## 2025-03-10 DIAGNOSIS — C50.411 CARCINOMA OF UPPER-OUTER QUADRANT OF RIGHT BREAST IN FEMALE, ESTROGEN RECEPTOR NEGATIVE (HCC): Primary | ICD-10-CM

## 2025-03-10 DIAGNOSIS — Z98.890 STATUS POST RIGHT BREAST LUMPECTOMY: ICD-10-CM

## 2025-03-10 DIAGNOSIS — Z17.1 CARCINOMA OF UPPER-OUTER QUADRANT OF RIGHT BREAST IN FEMALE, ESTROGEN RECEPTOR NEGATIVE (HCC): Primary | ICD-10-CM

## 2025-03-10 PROCEDURE — 99024 POSTOP FOLLOW-UP VISIT: CPT | Performed by: SURGERY

## 2025-03-10 NOTE — PROGRESS NOTES
[facility]    Ezekiel Reyna MD FACS  General Surgery  Postprocedure Evaluation in Office  Pt Name: Mariana Lowery  Date of Birth 1970   Today's Date: 3/10/2025  Medical Record Number: [id number]  Referring Provider: No ref. provider found  Primary Care Provider: Marcell Savage MD  Chief Complaint   Patient presents with    Post-Op Check     S/p Right  Lumpectomy and [place] Lymphnode biopsy 2/27/25     ASSESSMENT      Problem List Items Addressed This Visit       Carcinoma of upper-outer quadrant of right breast in female, estrogen receptor negative (HCC) - Primary    Relevant Orders    Ambulatory referral to Radiation Oncology    Status post right breast lumpectomy and sentinel lymph node biopsy    Relevant Orders    Ambulatory referral to Radiation Oncology      Cancer Staging   Carcinoma of upper-outer quadrant of right breast in female, estrogen receptor negative (HCC)  Staging form: Breast, AJCC 8th Edition  - Clinical stage from 2/17/2025: Stage IB (cT1c, cN0(f), cM0, G3, ER-, AL-, HER2-) - Signed by Ezekiel Reyna MD on 2/17/2025  - Pathologic stage from 3/10/2025: Stage IB (pT1c, pN0(sn), cM0, G3, ER-, AL-, HER2-) - Signed by Ezekiel Reyna MD on 3/10/2025       PLANS   Assessment & Plan    Patient Instructions   Patient still refusing chemo and will cancel her oncology appt    Follow up: Return in about 2 months (around 5/10/2025).  Final pathology review:   FINAL DIAGNOSIS:   A.  Breast, right, lumpectomy:     Poorly differentiated invasive ductal carcinoma.     Melony score: 3 of 3.     Tumor size: 1.2 cm.     DCIS: Focally present, spanning 3 mm, nuclear grade 3.     Margins: Negative for malignancy (the closest margin from invasive   tumor is     posterior at 1 mm;  DCIS is greater than 5 mm from the posterior   margin).    Pathologic stage: pT1c, pN0 (sn).   B.  Lymph node, right sentinel, excision:     One lymph node is negative for malignancy (0/1).

## 2025-03-13 ENCOUNTER — TELEPHONE (OUTPATIENT)
Dept: ONCOLOGY | Age: 55
End: 2025-03-13

## 2025-03-13 NOTE — TELEPHONE ENCOUNTER
Pt called today to cancel her new patient on 03/20 and stated \"I am not doing chemo and I don't know why this appointment was scheduled.\"

## 2025-03-18 ENCOUNTER — LAB (OUTPATIENT)
Dept: LAB | Age: 55
End: 2025-03-18

## 2025-03-18 DIAGNOSIS — E78.00 HYPERCHOLESTEROLEMIA: ICD-10-CM

## 2025-03-18 DIAGNOSIS — R25.2 CRAMP IN MUSCLE: ICD-10-CM

## 2025-03-18 LAB
ALBUMIN SERPL BCG-MCNC: 3.9 G/DL (ref 3.4–4.9)
ALP SERPL-CCNC: 107 U/L (ref 35–104)
ALT SERPL W/O P-5'-P-CCNC: 18 U/L (ref 10–35)
ANION GAP SERPL CALC-SCNC: 11 MEQ/L (ref 8–16)
AST SERPL-CCNC: 23 U/L (ref 10–35)
BILIRUB SERPL-MCNC: 0.4 MG/DL (ref 0.3–1.2)
BUN SERPL-MCNC: 16 MG/DL (ref 8–23)
CALCIUM SERPL-MCNC: 9.5 MG/DL (ref 8.6–10)
CHLORIDE SERPL-SCNC: 104 MEQ/L (ref 98–111)
CHOLEST SERPL-MCNC: 189 MG/DL (ref 100–199)
CO2 SERPL-SCNC: 22 MEQ/L (ref 22–29)
CREAT SERPL-MCNC: 1 MG/DL (ref 0.5–0.9)
FOLATE SERPL-MCNC: 8.5 NG/ML (ref 4.6–34.8)
GFR SERPL CREATININE-BSD FRML MDRD: 67 ML/MIN/1.73M2
GLUCOSE SERPL-MCNC: 118 MG/DL (ref 74–109)
HDLC SERPL-MCNC: 32 MG/DL
LDLC SERPL CALC-MCNC: 124 MG/DL
MAGNESIUM SERPL-MCNC: 2.2 MG/DL (ref 1.6–2.6)
POTASSIUM SERPL-SCNC: 4.1 MEQ/L (ref 3.5–5.2)
PROT SERPL-MCNC: 7.3 G/DL (ref 6.4–8.3)
SODIUM SERPL-SCNC: 137 MEQ/L (ref 135–145)
T4 FREE SERPL-MCNC: 0.9 NG/DL (ref 0.92–1.68)
TRIGL SERPL-MCNC: 167 MG/DL (ref 0–199)
TSH SERPL DL<=0.05 MIU/L-ACNC: 4.32 UIU/ML (ref 0.27–4.2)
VIT B12 SERPL-MCNC: 600 PG/ML (ref 232–1245)

## 2025-03-19 ENCOUNTER — RESULTS FOLLOW-UP (OUTPATIENT)
Dept: FAMILY MEDICINE CLINIC | Age: 55
End: 2025-03-19

## 2025-03-19 ENCOUNTER — HOSPITAL ENCOUNTER (OUTPATIENT)
Dept: PHYSICAL THERAPY | Age: 55
Setting detail: THERAPIES SERIES
Discharge: HOME | End: 2025-03-19
Payer: COMMERCIAL

## 2025-03-19 PROCEDURE — 97110 THERAPEUTIC EXERCISES: CPT

## 2025-03-19 NOTE — PROGRESS NOTES
[facility]  ONCOLOGY REHABILITATION  PHYSICAL THERAPY  [x] PROGRESS NOTE    [x] SPECIALIZED THERAPY SERVICES - [place]     Date: 3/19/2025  Patient Name:  Mariana Lowery  : 1970  MRN: [id number]  [facility]: [id number]    Referring Practitioner Ezekiel Reyna MD [id number]      Diagnosis  Diagnoses       C50.411 (ICD-10-CM) - Malignant neoplasm of upper-outer quadrant of right female breast (HCC)    Z17.1 (ICD-10-CM) - Estrogen receptor negative status (ER-)           Treatment Diagnosis I89.0 Lymphedema, not elsewhere classified  M25.511  Right Shoulder Pain  M25.611 Stiffness of Right Shoulder   Date of Evaluation 25   Additional Pertinent History Mariana Lowery has a past medical history of Breast cancer of upper-outer quadrant of right female breast (HCC), Cancer (HCC), Hyperlipidemia, Left carotid bruit, and Xanthelasma.  she has a past surgical history that includes Dental surgery; Mouth Biopsy; Tubal ligation (); Leg Surgery (Left); Colonoscopy (06/15/2021); skin biopsy; Dilation and curettage of uterus (N/A, 2023); MEENA NEEDLE BIOPSY LYMPH NODE SUPERFICIAL (N/A, 2025); MEENA NEEDLE BIOPSY LYMPH NODE SUPERFICIAL (N/A, 2025); MEENA US GUIDED BREAST BIOPSY W LOC DEVICE 1ST LESION RIGHT (Right, 2025); US PLACE BREAST LOC DEVICE 1ST LESION RIGHT (Right, 2025); and Breast lumpectomy (Right, 2025).     Allergies No Known Allergies   Medications   Current Outpatient Medications:     atorvastatin (LIPITOR) 40 MG tablet, Take 1 tablet by mouth daily, Disp: 90 tablet, Rfl: 1    ibuprofen (ADVIL;MOTRIN) 600 MG tablet, Take 1 tablet by mouth every 6 hours as needed for Pain, Disp: 30 tablet, Rfl: 1      Functional Outcome Measure Used QuickDASH   Functional Outcome Score 11 or 0% impaired (25)   19 or 18.18% impaired (3/19/25)      Insurance: Primary: Payor: [place] [employer] /  /  / ,   Secondary:    Authorization Information PRE CERTIFICATION

## 2025-03-24 ENCOUNTER — HOSPITAL ENCOUNTER (OUTPATIENT)
Dept: RADIATION ONCOLOGY | Age: 55
Discharge: HOME | End: 2025-03-24
Payer: COMMERCIAL

## 2025-03-24 VITALS
HEIGHT: 63 IN | OXYGEN SATURATION: 97 % | HEART RATE: 93 BPM | DIASTOLIC BLOOD PRESSURE: 76 MMHG | WEIGHT: 159 LBS | TEMPERATURE: 97.7 F | SYSTOLIC BLOOD PRESSURE: 133 MMHG | BODY MASS INDEX: 28.17 KG/M2 | RESPIRATION RATE: 18 BRPM

## 2025-03-24 PROBLEM — Z17.1 MALIGNANT NEOPLASM OF UPPER-OUTER QUADRANT OF RIGHT BREAST IN FEMALE, ESTROGEN RECEPTOR NEGATIVE: Status: ACTIVE | Noted: 2025-03-24

## 2025-03-24 PROBLEM — C50.411 MALIGNANT NEOPLASM OF UPPER-OUTER QUADRANT OF RIGHT BREAST IN FEMALE, ESTROGEN RECEPTOR NEGATIVE: Status: ACTIVE | Noted: 2025-03-24

## 2025-03-24 PROCEDURE — 99204 OFFICE O/P NEW MOD 45 MIN: CPT | Performed by: PHYSICIAN ASSISTANT

## 2025-03-24 PROCEDURE — 99202 OFFICE O/P NEW SF 15 MIN: CPT | Performed by: RADIOLOGY

## 2025-03-24 RX ORDER — PHENOL 1.4 %
1 AEROSOL, SPRAY (ML) MUCOUS MEMBRANE DAILY
COMMUNITY

## 2025-03-24 ASSESSMENT — ENCOUNTER SYMPTOMS
RECTAL PAIN: 0
BACK PAIN: 0
COUGH: 1
NAUSEA: 0
ABDOMINAL PAIN: 0
TROUBLE SWALLOWING: 0
DIARRHEA: 0
BLOOD IN STOOL: 0
SHORTNESS OF BREATH: 0

## 2025-03-24 NOTE — PROGRESS NOTES
Expenses: Hard   Food Insecurity: Food Insecurity Present (3/6/2025)    Hunger Vital Sign     Worried About Running Out of Food in the Last Year: Sometimes true     Ran Out of Food in the Last Year: Sometimes true   Transportation Needs: No Transportation Needs (3/6/2025)    PRAPARE - Transportation     Lack of Transportation (Medical): No     Lack of Transportation (Non-Medical): No   Physical Activity: Not on file   Stress: Not on file   Social Connections: Not on file   Intimate Partner Violence: Not on file   Housing Stability: High Risk (3/6/2025)    Housing Stability Vital Sign     Unable to Pay for Housing in the Last Year: Yes     Number of Times Moved in the Last Year: 0     Homeless in the Last Year: No       ALLERGIES: No Known Allergies       Current Outpatient Medications   Medication Sig Dispense Refill    calcium carbonate 600 MG TABS tablet Take 1 tablet by mouth daily      NONFORMULARY GRAVIOLA supplement daily      atorvastatin (LIPITOR) 40 MG tablet Take 1 tablet by mouth daily 90 tablet 1    ibuprofen (ADVIL;MOTRIN) 600 MG tablet Take 1 tablet by mouth every 6 hours as needed for Pain 30 tablet 1     No current facility-administered medications for this encounter.     Outpatient Medications Marked as Taking for the 3/24/25 encounter (Hospital Encounter) with Mark Johnston PA   Medication Sig Dispense Refill    calcium carbonate 600 MG TABS tablet Take 1 tablet by mouth daily      NONFORMULARY GRAVIOLA supplement daily          LABORATORY STUDIES:   Onc labs:     CBC:   Lab Results   Component Value Date/Time    WBC 5.5 12/14/2024 09:19 AM    RBC 5.47 12/14/2024 09:19 AM    HGB 15.5 12/14/2024 09:19 AM    HCT 48.0 12/14/2024 09:19 AM    MCV 87.8 12/14/2024 09:19 AM    MCH 28.3 12/14/2024 09:19 AM    MCHC 32.3 12/14/2024 09:19 AM    RDW 13.4 02/09/2017 08:29 AM     12/14/2024 09:19 AM    MPV 10.2 12/14/2024 09:19 AM     MetabolicPanel:  Lab Results   Component Value Date/Time

## 2025-03-26 ENCOUNTER — SOCIAL WORK (OUTPATIENT)
Dept: RADIATION ONCOLOGY | Age: 55
End: 2025-03-26

## 2025-03-26 NOTE — PROGRESS NOTES
Oncology Social Work    Date: 3/26/2025  Time: 9:49 AM  Name: Mariana Lowery  MRN: [id number]     Contact Type: Distress Tool Follow-up    Note:   Situation: This staff called Mariana Lowery via phone support to introduce myself as the Oncology Social Worker.     Background: Mariana was referred to this staff by the Radiation Dept after a recent appointment here. This staff was calling to review the Distress Thermometer provided during their visit.    Coping Score 0    Areas of Concern Identified    Physical Concern: None selected    Expressive Concern: None selected    Social Concern: None selected    Practical Concern: None selected    Existential Concern: None selected    Assessment: This staff had the opportunity to speak with Mariana. She seemed pleasant as we discussed the call's purpose was to educate about the services provided by the SW. She had no outstanding concerns and shared that she is coping at this time. She was at work and unable to converse much. She did ask for assistance on completing her FMLA paperwork. She explained she will be doing 20 weeks (?) of radiation 5 days a week. She was instructed to get the information to her Nurse Navigator (ANA Balbuena) and I will follow up with her to get it completed.   - The opportunity to complete an Advance Directive was not offered although it is not available in Medical Records. It will be discussed later in the future.  - No community referrals were placed now because she is too early to know or understand what services she may need. Therefore, her referral services may be reconsidered should her needs regarding assistance change.    Recommendation: Follow-up will be initiated based on need.  provided my contact information and will remain available for support.        SLEVIN Guerrero, LUCIEN, PROSPER  Oncology Social Worker      Electronically signed by SELVIN Guerrero LSW, ACHP-SW on 3/26/2025 at 9:49 AM

## 2025-03-27 ENCOUNTER — OFFICE VISIT (OUTPATIENT)
Dept: FAMILY MEDICINE CLINIC | Age: 55
End: 2025-03-27

## 2025-03-27 VITALS
BODY MASS INDEX: 27.82 KG/M2 | SYSTOLIC BLOOD PRESSURE: 126 MMHG | DIASTOLIC BLOOD PRESSURE: 72 MMHG | WEIGHT: 157 LBS | HEIGHT: 63 IN | RESPIRATION RATE: 12 BRPM | HEART RATE: 68 BPM

## 2025-03-27 DIAGNOSIS — E03.8 SUBCLINICAL HYPOTHYROIDISM: Primary | ICD-10-CM

## 2025-03-27 DIAGNOSIS — E78.00 HYPERCHOLESTEROLEMIA: ICD-10-CM

## 2025-03-27 PROCEDURE — 99213 OFFICE O/P EST LOW 20 MIN: CPT | Performed by: EMERGENCY MEDICINE

## 2025-03-27 ASSESSMENT — ENCOUNTER SYMPTOMS
ABDOMINAL PAIN: 0
COUGH: 0
TROUBLE SWALLOWING: 0
RHINORRHEA: 0
CHEST TIGHTNESS: 0
BACK PAIN: 0
CONSTIPATION: 0
VOMITING: 0
DIARRHEA: 0
SINUS PRESSURE: 0
SHORTNESS OF BREATH: 0
NAUSEA: 0
VOICE CHANGE: 0
WHEEZING: 0
SORE THROAT: 0

## 2025-03-27 NOTE — PROGRESS NOTES
Date: 3/27/2025    :    Mariana Lowery is a 54 y.o.female who presents today for:  Chief Complaint   Patient presents with    Discuss Labs     Thyroid         HPI:     HPI    Patient is here for follow-up,, she has been doing well her leg cramps is gone.  Came here to discuss about results of her blood test    CurrentHome Medications were reviewed and updated today by this Provider  Current Outpatient Medications   Medication Sig Dispense Refill    calcium carbonate 600 MG TABS tablet Take 1 tablet by mouth daily      NONFORMULARY GRAVIOLA supplement daily      atorvastatin (LIPITOR) 40 MG tablet Take 1 tablet by mouth daily 90 tablet 1    ibuprofen (ADVIL;MOTRIN) 600 MG tablet Take 1 tablet by mouth every 6 hours as needed for Pain 30 tablet 1     No current facility-administered medications for this visit.       Previous Notes, Consultations Notes, Labs taken 3/18/2025 and 3/21/2025 and previous laboratories available were reviewed with the patient during this visit:    Allergies, Problem List, Medications, Medical History, Family History, Surgical History and Tobacco History reviewed and updated by provider.       Subjective:      Review of Systems   Constitutional:  Negative for appetite change, chills, diaphoresis, fatigue and fever.   HENT:  Negative for congestion, ear pain, postnasal drip, rhinorrhea, sinus pressure, sneezing, sore throat, trouble swallowing and voice change.    Respiratory:  Negative for cough, chest tightness, shortness of breath and wheezing.    Cardiovascular:  Negative for chest pain, palpitations and leg swelling.   Gastrointestinal:  Negative for abdominal pain, constipation, diarrhea, nausea and vomiting.   Musculoskeletal:  Negative for arthralgias, back pain, joint swelling, myalgias, neck pain and neck stiffness.   Neurological:  Negative for dizziness, syncope, weakness, light-headedness, numbness and headaches.       Objective:     /72 (BP Site: Left Upper Arm, Patient

## 2025-04-21 ENCOUNTER — HOSPITAL ENCOUNTER (OUTPATIENT)
Dept: RADIATION ONCOLOGY | Age: 55
Discharge: HOME | End: 2025-04-21
Payer: COMMERCIAL

## 2025-04-21 ENCOUNTER — HOSPITAL ENCOUNTER (OUTPATIENT)
Dept: CT IMAGING | Age: 55
Discharge: HOME | End: 2025-04-21

## 2025-04-21 ENCOUNTER — APPOINTMENT (OUTPATIENT)
Dept: RADIATION ONCOLOGY | Age: 55
End: 2025-04-21
Payer: COMMERCIAL

## 2025-04-21 DIAGNOSIS — C50.411 MALIGNANT NEOPLASM OF UPPER-OUTER QUADRANT OF RIGHT BREAST IN FEMALE, ESTROGEN RECEPTOR NEGATIVE (HCC): ICD-10-CM

## 2025-04-21 DIAGNOSIS — Z17.1 MALIGNANT NEOPLASM OF UPPER-OUTER QUADRANT OF RIGHT BREAST IN FEMALE, ESTROGEN RECEPTOR NEGATIVE (HCC): ICD-10-CM

## 2025-04-21 PROCEDURE — 3209999900 CT GUIDE RADIATION THERAPY NO CHARGE

## 2025-04-21 PROCEDURE — 77290 THER RAD SIMULAJ FIELD CPLX: CPT | Performed by: RADIOLOGY

## 2025-04-21 PROCEDURE — 77334 RADIATION TREATMENT AID(S): CPT | Performed by: RADIOLOGY

## 2025-04-21 PROCEDURE — 77263 THER RADIOLOGY TX PLNG CPLX: CPT | Performed by: RADIOLOGY

## 2025-04-23 ENCOUNTER — APPOINTMENT (OUTPATIENT)
Dept: RADIATION ONCOLOGY | Age: 55
End: 2025-04-23
Payer: COMMERCIAL

## 2025-04-29 ENCOUNTER — HOSPITAL ENCOUNTER (OUTPATIENT)
Dept: RADIATION ONCOLOGY | Age: 55
End: 2025-04-29
Payer: COMMERCIAL

## 2025-04-29 PROCEDURE — 77334 RADIATION TREATMENT AID(S): CPT | Performed by: RADIOLOGY

## 2025-05-01 ENCOUNTER — HOSPITAL ENCOUNTER (OUTPATIENT)
Dept: RADIATION ONCOLOGY | Age: 55
Discharge: HOME | End: 2025-05-01
Payer: COMMERCIAL

## 2025-05-01 VITALS
RESPIRATION RATE: 16 BRPM | TEMPERATURE: 97.6 F | WEIGHT: 163.8 LBS | HEART RATE: 106 BPM | SYSTOLIC BLOOD PRESSURE: 115 MMHG | BODY MASS INDEX: 29.02 KG/M2 | DIASTOLIC BLOOD PRESSURE: 74 MMHG | OXYGEN SATURATION: 95 %

## 2025-05-01 PROCEDURE — 77412 RADIATION TX DELIVERY LVL 3: CPT | Performed by: RADIOLOGY

## 2025-05-01 PROCEDURE — 77280 THER RAD SIMULAJ FIELD SMPL: CPT | Performed by: RADIOLOGY

## 2025-05-01 NOTE — PROGRESS NOTES
[facility]  [address] [id number]  Phone: [phone]   Toll Free: [phone]   Fax: [phone]    RADIATION ONCOLOGY EDUCATION    CHIEF COMPLAINT: Mariana presents to radiation oncology today for education regarding treatment to the breast.      PLAN:   Expected and potential side effects were discussed in detail, along with written handouts.  Skin care and moisturization instructions were discussed in detail.   Patient was  already connected with physical therapy  Patient was informed of dietician that is available weekly and as needed.  Educated on weekly on treatment visit to meet with Physician to monitor side effects and treatment course.  Informed patient that Physician is available at any time to discuss radiation side effects/concerns through out treatment course.  Mariana had the opportunity to ask questions, and indicated that all questions were satisfactorily addressed.      
noted    Ventilation Alteration  Cough: None  Hemoptysis: None  Dyspnea: Normal  Mucous Quantity/Quality: n/a    Additional Comments: Teaching completed today.     MEDICATIONS:     Current Outpatient Medications   Medication Sig Dispense Refill    calcium carbonate 600 MG TABS tablet Take 1 tablet by mouth daily      NONFORMULARY GRAVIOLA supplement daily      atorvastatin (LIPITOR) 40 MG tablet Take 1 tablet by mouth daily 90 tablet 1    ibuprofen (ADVIL;MOTRIN) 600 MG tablet Take 1 tablet by mouth every 6 hours as needed for Pain 30 tablet 1     No current facility-administered medications for this encounter.        PHYSICAL EXAM:       ECO - Asymptomatic (Fully active, able to carry on all pre-disease activities without restriction)    General: NAD, AO x 3, Mentation is clear with appropriate affect.  HEENT: Normocephalic, atraumatic  Thorax:  Unlabored  Abdomen:  Non-distended  Neuro:  Cranial nerves grossly intact; no focal deficits    Chemotherapy Update: None - Declined chemotherapy and is not candidate for hormonal (triple negative)    Treatment Imaging: Kv Pair    ASSESSMENT: No significant radiation side effects. Responding appropriately to symptomatic management. First treatment today.    New medications, diagnostic results: No new images, medications, or changes to current recommendations    PLAN: Again reviewed potential side effects of radiation for the patient's treatment.    Continue local/topical care.    Continue current radiation course as prescribed.

## 2025-05-02 ENCOUNTER — HOSPITAL ENCOUNTER (OUTPATIENT)
Dept: RADIATION ONCOLOGY | Age: 55
Discharge: HOME | End: 2025-05-02
Payer: COMMERCIAL

## 2025-05-02 PROCEDURE — 77412 RADIATION TX DELIVERY LVL 3: CPT | Performed by: RADIOLOGY

## 2025-05-05 ENCOUNTER — HOSPITAL ENCOUNTER (OUTPATIENT)
Dept: RADIATION ONCOLOGY | Age: 55
Discharge: HOME | End: 2025-05-05
Payer: COMMERCIAL

## 2025-05-05 ENCOUNTER — TELEPHONE (OUTPATIENT)
Dept: SURGERY | Age: 55
End: 2025-05-05

## 2025-05-05 PROCEDURE — 77336 RADIATION PHYSICS CONSULT: CPT | Performed by: RADIOLOGY

## 2025-05-05 PROCEDURE — 77412 RADIATION TX DELIVERY LVL 3: CPT | Performed by: RADIOLOGY

## 2025-05-05 NOTE — TELEPHONE ENCOUNTER
Need to move patient's appointment with Dr. Reyna on 05-12 back as he will be in surgery.  Left message for patient.  Will also send a MyChart message.

## 2025-05-06 ENCOUNTER — HOSPITAL ENCOUNTER (OUTPATIENT)
Dept: RADIATION ONCOLOGY | Age: 55
Discharge: HOME | End: 2025-05-06
Payer: COMMERCIAL

## 2025-05-06 PROCEDURE — 77412 RADIATION TX DELIVERY LVL 3: CPT | Performed by: RADIOLOGY

## 2025-05-07 ENCOUNTER — HOSPITAL ENCOUNTER (OUTPATIENT)
Dept: RADIATION ONCOLOGY | Age: 55
Discharge: HOME | End: 2025-05-07
Payer: COMMERCIAL

## 2025-05-07 PROCEDURE — 77387 GUIDANCE FOR RADJ TX DLVR: CPT | Performed by: RADIOLOGY

## 2025-05-07 PROCEDURE — 77412 RADIATION TX DELIVERY LVL 3: CPT | Performed by: RADIOLOGY

## 2025-05-07 PROCEDURE — G6002 STEREOSCOPIC X-RAY GUIDANCE: HCPCS | Performed by: RADIOLOGY

## 2025-05-08 ENCOUNTER — HOSPITAL ENCOUNTER (OUTPATIENT)
Dept: RADIATION ONCOLOGY | Age: 55
Discharge: HOME | End: 2025-05-08
Payer: COMMERCIAL

## 2025-05-08 VITALS
BODY MASS INDEX: 29.13 KG/M2 | HEART RATE: 91 BPM | OXYGEN SATURATION: 97 % | SYSTOLIC BLOOD PRESSURE: 117 MMHG | RESPIRATION RATE: 16 BRPM | WEIGHT: 164.46 LBS | DIASTOLIC BLOOD PRESSURE: 77 MMHG | TEMPERATURE: 97.9 F

## 2025-05-08 PROCEDURE — 77417 THER RADIOLOGY PORT IMAGE(S): CPT | Performed by: RADIOLOGY

## 2025-05-08 PROCEDURE — 77412 RADIATION TX DELIVERY LVL 3: CPT | Performed by: RADIOLOGY

## 2025-05-08 NOTE — PROGRESS NOTES
[facility]          [address], [address]        [place], [place] [address]        O: [phone]        F: [phone]       [place]            Dr. Ezekiel Enriquez MD MS          Dr. Kelsey Mejia MD PhD    ON TREATMENT VISIT (OTV) NOTE     Date of Service: 2025  Patient ID: Mariana Lowery   : 1970  MRN: [id number]   Acct Number: [id number]     RADIATION ONCOLOGY ATTENDING:  Kelsey Mejia PhD MD    DIAGNOSIS:   Cancer Staging   Carcinoma of upper-outer quadrant of right breast in female, estrogen receptor negative (HCC)  Staging form: Breast, AJCC 8th Edition  - Clinical stage from 2025: Stage IB (cT1c, cN0(f), cM0, G3, ER-, [place]-, HER2-) - Signed by Ezekiel Reyna MD on 2025  - Pathologic stage from 3/10/2025: Stage IB (pT1c, pN0(sn), cM0, G3, ER-, [place]-, HER2-) - Signed by Ezekiel Reyna MD on 3/10/2025      Treatment Area: Breast     Current Total Dose(cGy): 1602  Current Fraction:   Final/Cumulative Rx. Dose (cGy): 5005    Patient was seen today for weekly visit.     Wt Readings from Last 3 Encounters:   25 74.6 kg (164 lb 7.4 oz)   25 74.3 kg (163 lb 12.8 oz)   25 71.2 kg (157 lb)       /77   Pulse 91   Temp 97.9 °F (36.6 °C) (Infrared)   Resp 16   Wt 74.6 kg (164 lb 7.4 oz)   LMP 04/10/2023   SpO2 97%   BMI 29.13 kg/m²     Lab Results   Component Value Date    WBC 5.5 2024    HGB 15.5 2024    HCT 48.0 (H) 2024     2024       Comfort Alteration  Fatigue:None, able to perform daily activities    Pain Location: Denies  Pain Intensity (Current): 0 No Pain  Pain Treatment: N/A  Pain Relief: n/a  Hot Flashes/Flushes: None    Emotional Alteration:   Coping: effective    Nutritional Alteration  Anorexia: none   Nausea: No nausea noted  Vomiting: No vomiting   Dyspepsia/Heartburn: None    Skin Alteration   Skin reaction: No changes

## 2025-05-09 ENCOUNTER — HOSPITAL ENCOUNTER (OUTPATIENT)
Dept: RADIATION ONCOLOGY | Age: 55
Discharge: HOME | End: 2025-05-09
Payer: COMMERCIAL

## 2025-05-09 ENCOUNTER — HOSPITAL ENCOUNTER (OUTPATIENT)
Dept: CT IMAGING | Age: 55
Discharge: HOME | End: 2025-05-09

## 2025-05-09 DIAGNOSIS — Z17.1 MALIGNANT NEOPLASM OF UPPER-OUTER QUADRANT OF BOTH BREASTS IN FEMALE, ESTROGEN RECEPTOR NEGATIVE (HCC): ICD-10-CM

## 2025-05-09 DIAGNOSIS — C50.412 MALIGNANT NEOPLASM OF UPPER-OUTER QUADRANT OF BOTH BREASTS IN FEMALE, ESTROGEN RECEPTOR NEGATIVE (HCC): ICD-10-CM

## 2025-05-09 DIAGNOSIS — C50.411 MALIGNANT NEOPLASM OF UPPER-OUTER QUADRANT OF BOTH BREASTS IN FEMALE, ESTROGEN RECEPTOR NEGATIVE (HCC): ICD-10-CM

## 2025-05-09 PROCEDURE — 77334 RADIATION TREATMENT AID(S): CPT | Performed by: RADIOLOGY

## 2025-05-09 PROCEDURE — 77412 RADIATION TX DELIVERY LVL 3: CPT | Performed by: RADIOLOGY

## 2025-05-09 PROCEDURE — 3209999900 CT GUIDE RADIATION THERAPY NO CHARGE

## 2025-05-09 PROCEDURE — 77290 THER RAD SIMULAJ FIELD CPLX: CPT | Performed by: RADIOLOGY

## 2025-05-12 ENCOUNTER — HOSPITAL ENCOUNTER (OUTPATIENT)
Dept: RADIATION ONCOLOGY | Age: 55
Discharge: HOME | End: 2025-05-12
Payer: COMMERCIAL

## 2025-05-12 ENCOUNTER — OFFICE VISIT (OUTPATIENT)
Dept: SURGERY | Age: 55
End: 2025-05-12

## 2025-05-12 VITALS
HEART RATE: 99 BPM | HEIGHT: 63 IN | DIASTOLIC BLOOD PRESSURE: 84 MMHG | OXYGEN SATURATION: 97 % | TEMPERATURE: 97 F | BODY MASS INDEX: 28.83 KG/M2 | RESPIRATION RATE: 18 BRPM | SYSTOLIC BLOOD PRESSURE: 130 MMHG | WEIGHT: 162.7 LBS

## 2025-05-12 DIAGNOSIS — Z98.890 STATUS POST RIGHT BREAST LUMPECTOMY: ICD-10-CM

## 2025-05-12 DIAGNOSIS — Z17.1 CARCINOMA OF UPPER-OUTER QUADRANT OF RIGHT BREAST IN FEMALE, ESTROGEN RECEPTOR NEGATIVE (HCC): Primary | ICD-10-CM

## 2025-05-12 DIAGNOSIS — C50.411 CARCINOMA OF UPPER-OUTER QUADRANT OF RIGHT BREAST IN FEMALE, ESTROGEN RECEPTOR NEGATIVE (HCC): Primary | ICD-10-CM

## 2025-05-12 PROCEDURE — 99024 POSTOP FOLLOW-UP VISIT: CPT | Performed by: SURGERY

## 2025-05-12 PROCEDURE — 77336 RADIATION PHYSICS CONSULT: CPT | Performed by: RADIOLOGY

## 2025-05-12 PROCEDURE — 77412 RADIATION TX DELIVERY LVL 3: CPT | Performed by: RADIOLOGY

## 2025-05-12 NOTE — PROGRESS NOTES
[facility]    Ezekiel Reyna MD FACS  General Surgery  Postprocedure Evaluation in Office  Pt Name: Mariana Lowery  Date of Birth 1970   Today's Date: 5/12/2025  Medical Record Number: [id number]  Referring Provider: No ref. provider found  Primary Care Provider: Marcell Savage MD  Chief Complaint   Patient presents with    Post-Op Check     S/p Right  Lumpectomy and [place] Lymphnode biopsy 2/27/25-Last seen 3/10/25     ASSESSMENT      Problem List Items Addressed This Visit       Carcinoma of upper-outer quadrant of right breast in female, estrogen receptor negative (HCC) - Primary    Status post right breast lumpectomy and sentinel lymph node biopsy      Cancer Staging   Carcinoma of upper-outer quadrant of right breast in female, estrogen receptor negative (HCC)  Staging form: Breast, AJCC 8th Edition  - Clinical stage from 2/17/2025: Stage IB (cT1c, cN0(f), cM0, G3, ER-, [place]-, HER2-) - Signed by Ezekiel Reyna MD on 2/17/2025  - Pathologic stage from 3/10/2025: Stage IB (pT1c, pN0(sn), cM0, G3, ER-, [place]-, HER2-) - Signed by Ezekiel Reyna MD on 3/10/2025       PLANS   Assessment & Plan    There are no Patient Instructions on file for this visit.  Follow up: Return in about 3 months (around 8/12/2025).  Final pathology review:   FINAL DIAGNOSIS:   A.  Breast, right, lumpectomy:     Poorly differentiated invasive ductal carcinoma.     Melony score: 3 of 3.     Tumor size: 1.2 cm.     DCIS: Focally present, spanning 3 mm, nuclear grade 3.     Margins: Negative for malignancy (the closest margin from invasive   tumor is     posterior at 1 mm;  DCIS is greater than 5 mm from the posterior   margin).    Pathologic stage: pT1c, pN0 (sn).   B.  Lymph node, right sentinel, excision:     One lymph node is negative for malignancy (0/1).   C.  Lymph node, right axilla, excision:     One lymph node is negative for malignancy   4  A copy of final pathology report was given to

## 2025-05-13 ENCOUNTER — HOSPITAL ENCOUNTER (OUTPATIENT)
Dept: RADIATION ONCOLOGY | Age: 55
Discharge: HOME | End: 2025-05-13
Payer: COMMERCIAL

## 2025-05-13 PROCEDURE — 77412 RADIATION TX DELIVERY LVL 3: CPT | Performed by: RADIOLOGY

## 2025-05-14 ENCOUNTER — HOSPITAL ENCOUNTER (OUTPATIENT)
Dept: RADIATION ONCOLOGY | Age: 55
Discharge: HOME | End: 2025-05-14
Payer: COMMERCIAL

## 2025-05-14 PROCEDURE — 77412 RADIATION TX DELIVERY LVL 3: CPT | Performed by: RADIOLOGY

## 2025-05-14 PROCEDURE — 77334 RADIATION TREATMENT AID(S): CPT | Performed by: RADIOLOGY

## 2025-05-15 ENCOUNTER — HOSPITAL ENCOUNTER (OUTPATIENT)
Dept: RADIATION ONCOLOGY | Age: 55
Discharge: HOME | End: 2025-05-15
Payer: COMMERCIAL

## 2025-05-15 VITALS
DIASTOLIC BLOOD PRESSURE: 82 MMHG | HEART RATE: 89 BPM | WEIGHT: 164.24 LBS | SYSTOLIC BLOOD PRESSURE: 121 MMHG | OXYGEN SATURATION: 97 % | RESPIRATION RATE: 16 BRPM | TEMPERATURE: 98 F | BODY MASS INDEX: 29.09 KG/M2

## 2025-05-15 PROCEDURE — 77417 THER RADIOLOGY PORT IMAGE(S): CPT | Performed by: RADIOLOGY

## 2025-05-15 PROCEDURE — 77412 RADIATION TX DELIVERY LVL 3: CPT | Performed by: RADIOLOGY

## 2025-05-15 PROCEDURE — 77334 RADIATION TREATMENT AID(S): CPT | Performed by: RADIOLOGY

## 2025-05-15 NOTE — PROGRESS NOTES
[facility]          [address], [address]        [place], [place] [address]        O: [phone]        F: [phone]       [employer]            Dr. Ezekiel Enriquez MD MS          Dr. Kelsey Mejia MD PhD    ON TREATMENT VISIT (OTV) NOTE     Date of Service: 5/15/2025  Patient ID: Mariana Lowery   : 1970  MRN: [id number]   Acct Number: [id number]     RADIATION ONCOLOGY ATTENDING:  Kelsey Mejia PhD MD    DIAGNOSIS:   Cancer Staging   Carcinoma of upper-outer quadrant of right breast in female, estrogen receptor negative (HCC)  Staging form: Breast, AJCC 8th Edition  - Clinical stage from 2025: Stage IB (cT1c, cN0(f), cM0, G3, ER-, [place]-, HER2-) - Signed by Ezekiel Reyna MD on 2025  - Pathologic stage from 3/10/2025: Stage IB (pT1c, pN0(sn), cM0, G3, ER-, [place]-, HER2-) - Signed by Ezekiel Reyna MD on 3/10/2025      Treatment Area: Breast     Current Total Dose(cGy): 2937  Current Fraction:   Final/Cumulative Rx. Dose (cGy): 5005    Patient was seen today for weekly visit.     Wt Readings from Last 3 Encounters:   05/15/25 74.5 kg (164 lb 3.9 oz)   25 73.8 kg (162 lb 11.2 oz)   25 74.6 kg (164 lb 7.4 oz)       /82   Pulse 89   Temp 98 °F (36.7 °C) (Infrared)   Resp 16   Wt 74.5 kg (164 lb 3.9 oz)   LMP 04/10/2023   SpO2 97%   BMI 29.09 kg/m²     Lab Results   Component Value Date    WBC 5.5 2024    HGB 15.5 2024    HCT 48.0 (H) 2024     2024       Comfort Alteration  Fatigue:None, able to perform daily activities    Pain Location: Denies  Pain Intensity (Current): 0 No Pain  Pain Treatment: N/A  Pain Relief: n/a  Hot Flashes/Flushes: None    Emotional Alteration:   Coping: effective    Nutritional Alteration  Anorexia: none   Nausea: No nausea noted  Vomiting: No vomiting   Dyspepsia/Heartburn: None    Skin Alteration   Skin reaction: No changes

## 2025-05-16 ENCOUNTER — HOSPITAL ENCOUNTER (OUTPATIENT)
Dept: RADIATION ONCOLOGY | Age: 55
Discharge: HOME | End: 2025-05-16
Payer: COMMERCIAL

## 2025-05-16 PROCEDURE — 77412 RADIATION TX DELIVERY LVL 3: CPT | Performed by: RADIOLOGY

## 2025-05-19 ENCOUNTER — HOSPITAL ENCOUNTER (OUTPATIENT)
Dept: RADIATION ONCOLOGY | Age: 55
Discharge: HOME | End: 2025-05-19
Payer: COMMERCIAL

## 2025-05-19 PROCEDURE — 77336 RADIATION PHYSICS CONSULT: CPT | Performed by: RADIOLOGY

## 2025-05-19 PROCEDURE — 77412 RADIATION TX DELIVERY LVL 3: CPT | Performed by: RADIOLOGY

## 2025-05-20 ENCOUNTER — HOSPITAL ENCOUNTER (OUTPATIENT)
Dept: RADIATION ONCOLOGY | Age: 55
Discharge: HOME | End: 2025-05-20
Payer: COMMERCIAL

## 2025-05-20 PROCEDURE — 77412 RADIATION TX DELIVERY LVL 3: CPT | Performed by: RADIOLOGY

## 2025-05-20 NOTE — DISCHARGE INSTRUCTIONS
PATIENT DISCHARGE INSTRUCTIONS    Remember that side effects present at the end of your treatments will improve within a few weeks after the last treatment.  Eat well balanced meals even though your treatments are finished.  This will help speed the healing process. Continue any special diets prescribed to control side effects until these side effects have been resolved.  Get plenty of rest.  If you have experienced fatigue and/or weakness, this may continue for several weeks after your last treatment.  Continue with your daily activities according to the way you feel.  Continue to be gentle with your skin.  Follow your present skin care instructions until your follow-up visit.  IF YOU DEVELOP ANY CHANGES IN YOUR SKIN IN THE AREA TREATED WITH RADIATION, PLEASE CALL THE RADIATION ONCOLOGY NURSE -[phone].  Protect your skin from any injury and avoid direct sun exposure in the treatment area.  The skin in the treated area may always be more sensitive than the rest of your skin.  Always use SPF 30 or higher sun block if you will be in the sun and cannot avoid exposure.  Please contact your referring physician for a follow-up appointment in addition to your Radiation Oncology appointment.  Presence of pain:   Medication Taper: No    See Instructions Dated: N/A  Follow up orders: Will be discussed at Follow-Up

## 2025-05-21 ENCOUNTER — HOSPITAL ENCOUNTER (OUTPATIENT)
Dept: RADIATION ONCOLOGY | Age: 55
Discharge: HOME | End: 2025-05-21
Payer: COMMERCIAL

## 2025-05-21 PROCEDURE — 77412 RADIATION TX DELIVERY LVL 3: CPT | Performed by: RADIOLOGY

## 2025-05-21 PROCEDURE — 77417 THER RADIOLOGY PORT IMAGE(S): CPT | Performed by: RADIOLOGY

## 2025-05-22 ENCOUNTER — HOSPITAL ENCOUNTER (OUTPATIENT)
Dept: RADIATION ONCOLOGY | Age: 55
Discharge: HOME | End: 2025-05-22
Payer: COMMERCIAL

## 2025-05-22 VITALS
SYSTOLIC BLOOD PRESSURE: 119 MMHG | DIASTOLIC BLOOD PRESSURE: 84 MMHG | TEMPERATURE: 98.2 F | BODY MASS INDEX: 28.78 KG/M2 | OXYGEN SATURATION: 95 % | HEART RATE: 95 BPM | WEIGHT: 162.48 LBS | RESPIRATION RATE: 18 BRPM

## 2025-05-22 PROCEDURE — 77280 THER RAD SIMULAJ FIELD SMPL: CPT | Performed by: RADIOLOGY

## 2025-05-22 PROCEDURE — 77412 RADIATION TX DELIVERY LVL 3: CPT | Performed by: RADIOLOGY

## 2025-05-22 NOTE — PROGRESS NOTES
[facility]          [address], [address]        [place], [place] [address]        O: [phone]        F: [phone]       "[employer]            Dr. Ezekiel Enriquez MD MS          Dr. Kelsey Mejia MD PhD    ON TREATMENT VISIT (OTV) NOTE     Date of Service: 2025  Patient ID: Mariana Lowery   : 1970  MRN: [id number]   Acct Number: [id number]     RADIATION ONCOLOGY ATTENDING:  Kelsey Mejia PhD MD    DIAGNOSIS:   Cancer Staging   Carcinoma of upper-outer quadrant of right breast in female, estrogen receptor negative (HCC)  Staging form: Breast, AJCC 8th Edition  - Clinical stage from 2025: Stage IB (cT1c, cN0(f), cM0, G3, ER-, [place]-, HER2-) - Signed by Ezekiel Reyna MD on 2025  - Pathologic stage from 3/10/2025: Stage IB (pT1c, pN0(sn), cM0, G3, ER-, [place]-, HER2-) - Signed by Ezekiel Reyna MD on 3/10/2025      Treatment Area: Breast     Current Total Dose(cGy): 4255  Current Fraction:   Final/Cumulative Rx. Dose (cGy): 5005    Patient was seen today for weekly visit.     Wt Readings from Last 3 Encounters:   25 73.7 kg (162 lb 7.7 oz)   05/15/25 74.5 kg (164 lb 3.9 oz)   25 73.8 kg (162 lb 11.2 oz)       /84   Pulse 95   Temp 98.2 °F (36.8 °C) (Infrared)   Resp 18   Wt 73.7 kg (162 lb 7.7 oz)   LMP 04/10/2023   SpO2 95%   BMI 28.78 kg/m²     Lab Results   Component Value Date    WBC 5.5 2024    HGB 15.5 2024    HCT 48.0 (H) 2024     2024       Comfort Alteration  Fatigue:None, able to perform daily activities    Pain Location: Denies  Pain Intensity (Current): 0 No Pain  Pain Treatment: N/A  Pain Relief: n/a  Hot Flashes/Flushes: None    Emotional Alteration:   Coping: effective    Nutritional Alteration  Anorexia: none   Nausea: No nausea noted  Vomiting: No vomiting   Dyspepsia/Heartburn: None    Skin Alteration   Skin reaction: Faint or

## 2025-05-23 ENCOUNTER — HOSPITAL ENCOUNTER (OUTPATIENT)
Dept: RADIATION ONCOLOGY | Age: 55
Discharge: HOME | End: 2025-05-23
Payer: COMMERCIAL

## 2025-05-23 PROCEDURE — 77412 RADIATION TX DELIVERY LVL 3: CPT | Performed by: RADIOLOGY

## 2025-05-27 ENCOUNTER — HOSPITAL ENCOUNTER (OUTPATIENT)
Dept: RADIATION ONCOLOGY | Age: 55
Discharge: HOME | End: 2025-05-27
Payer: COMMERCIAL

## 2025-05-27 PROCEDURE — 77336 RADIATION PHYSICS CONSULT: CPT | Performed by: RADIOLOGY

## 2025-05-27 PROCEDURE — 77412 RADIATION TX DELIVERY LVL 3: CPT | Performed by: RADIOLOGY

## 2025-05-28 ENCOUNTER — HOSPITAL ENCOUNTER (OUTPATIENT)
Dept: RADIATION ONCOLOGY | Age: 55
Discharge: HOME | End: 2025-05-28
Payer: COMMERCIAL

## 2025-05-28 PROCEDURE — 77412 RADIATION TX DELIVERY LVL 3: CPT | Performed by: RADIOLOGY

## 2025-06-09 DIAGNOSIS — E78.00 HYPERCHOLESTEROLEMIA: ICD-10-CM

## 2025-06-09 DIAGNOSIS — I77.9 LEFT-SIDED CAROTID ARTERY DISEASE, UNSPECIFIED TYPE: ICD-10-CM

## 2025-06-09 RX ORDER — ATORVASTATIN CALCIUM 40 MG/1
40 TABLET, FILM COATED ORAL DAILY
Qty: 90 TABLET | Refills: 1 | Status: SHIPPED | OUTPATIENT
Start: 2025-06-09

## 2025-06-09 NOTE — TELEPHONE ENCOUNTER
The pharmacy is requesting a refill of the below medication which has been pended for you:     Requested Prescriptions     Pending Prescriptions Disp Refills    atorvastatin (LIPITOR) 40 MG tablet [Pharmacy Med Name: Atorvastatin Calcium 40 MG Oral Tablet] 90 tablet 1     Sig: Take 1 tablet by mouth once daily       Last Appointment Date: 3/27/2025  Next Appointment Date: 9/30/2025    No Known Allergies

## (undated) DEVICE — PREP SOL PVP IODINE 4%  4 OZ/BTL

## (undated) DEVICE — CYSTO/BLADDER IRRIGATION SET, REGULATING CLAMP

## (undated) DEVICE — SUTURE VICRYL + SZ 4-0 L27IN ABSRB WHT FS-2 3/8 CIR REV CUT VCP422H

## (undated) DEVICE — TOWEL,OR,DSP,ST,BLUE,DLX,4/PK,20PK/CS: Brand: MEDLINE

## (undated) DEVICE — HANDPIECE ABLAT DISP FOR ENDOMET SYS

## (undated) DEVICE — PENCIL SMK EVAC 15FT BLADE ELECTRD ROCKER F/TELSCP

## (undated) DEVICE — TUBING, SUCTION, 1/4" X 12', STRAIGHT: Brand: MEDLINE

## (undated) DEVICE — GLOVE ORANGE PI 7 1/2   MSG9075

## (undated) DEVICE — GAUZE,SPONGE,8"X4",12PLY,XRAY,STRL,LF: Brand: MEDLINE

## (undated) DEVICE — Z DISCONTINUED BY MEDLINE USE 2711682 TRAY SKIN PREP DRY W/ PREM GLV

## (undated) DEVICE — SOLUTION SURG PREP SCRUB POV IOD 7.5% 4 OZ

## (undated) DEVICE — DRAPE,UNDERBUTTOCKS,PCH,STERILE: Brand: MEDLINE

## (undated) DEVICE — LIQUIBAND RAPID ADHESIVE 36/CS 0.8ML: Brand: MEDLINE

## (undated) DEVICE — PACK,SET UP,NO DRAPES: Brand: MEDLINE

## (undated) DEVICE — GLOVE SURG SZ 6 THK91MIL LTX FREE SYN POLYISOPRENE ANTI

## (undated) DEVICE — PAD,SANITARY,11 IN,MAXI,W/WINGS,N-STRL: Brand: MEDLINE

## (undated) DEVICE — SURE SET SINGLE BASIN-LF: Brand: MEDLINE INDUSTRIES, INC.

## (undated) DEVICE — BLADE,CARBON-STEEL,15,STRL,DISPOSABLE,TB: Brand: MEDLINE

## (undated) DEVICE — PAD,NON-ADHERENT,3X8,STERILE,LF,1/PK: Brand: MEDLINE

## (undated) DEVICE — APPLIER LIG CLP M L11IN TI STR RNG HNDL FOR 20 CLP DISP

## (undated) DEVICE — RED RUBBER ROBINSON URETHRAL CATHETER, RADIOPAQUE, SMOOTH ROUNDED TIP, 14 FR (4.7 MM): Brand: DOVER

## (undated) DEVICE — PROVE COVER: Brand: UNBRANDED

## (undated) DEVICE — BREAST HERNIA: Brand: MEDLINE INDUSTRIES, INC.

## (undated) DEVICE — SPECIMEN ORIENTATION CHARMS, SIX DISTINCTLY SHAPED STERILE 10MM CHARMS: Brand: MARGINMAP

## (undated) DEVICE — Z DISCONTINUED NO SUB IDED KIT ENDOMET ABLAT IMPED CTRL DEV W/ RF CTRL FTSWCH SUCT LN

## (undated) DEVICE — GOWN,SIRUS,NON REINFRCD,LARGE,SET IN SL: Brand: MEDLINE

## (undated) DEVICE — SOLUTION IV 1000ML 0.9% SOD CHL PH 5 INJ USP VIAFLX PLAS

## (undated) DEVICE — SUTURE PERMA-HAND SZ 2-0 L30IN NONABSORBABLE BLK L26MM SH K833H

## (undated) DEVICE — SUTURE VICRYL + SZ 3-0 L27IN ABSRB UD L26MM SH 1/2 CIR VCP416H

## (undated) DEVICE — GARMENT,MEDLINE,DVT,INT,CALF,MED, GEN2: Brand: MEDLINE